# Patient Record
Sex: MALE | Race: WHITE
[De-identification: names, ages, dates, MRNs, and addresses within clinical notes are randomized per-mention and may not be internally consistent; named-entity substitution may affect disease eponyms.]

---

## 2019-02-15 ENCOUNTER — HOSPITAL ENCOUNTER (INPATIENT)
Dept: HOSPITAL 95 - ER | Age: 84
LOS: 20 days | Discharge: NURSING, REHAB OR LONG-TERM CARE | DRG: 853 | End: 2019-03-07
Attending: HOSPITALIST | Admitting: HOSPITALIST
Payer: MEDICARE

## 2019-02-15 VITALS — BODY MASS INDEX: 29.39 KG/M2 | WEIGHT: 198.42 LBS | HEIGHT: 69.02 IN

## 2019-02-15 DIAGNOSIS — E87.2: ICD-10-CM

## 2019-02-15 DIAGNOSIS — R06.89: ICD-10-CM

## 2019-02-15 DIAGNOSIS — A41.9: Primary | ICD-10-CM

## 2019-02-15 DIAGNOSIS — N20.0: ICD-10-CM

## 2019-02-15 DIAGNOSIS — E78.00: ICD-10-CM

## 2019-02-15 DIAGNOSIS — R06.6: ICD-10-CM

## 2019-02-15 DIAGNOSIS — I48.91: ICD-10-CM

## 2019-02-15 DIAGNOSIS — I16.0: ICD-10-CM

## 2019-02-15 DIAGNOSIS — I12.9: ICD-10-CM

## 2019-02-15 DIAGNOSIS — E87.0: ICD-10-CM

## 2019-02-15 DIAGNOSIS — R65.20: ICD-10-CM

## 2019-02-15 DIAGNOSIS — I48.2: ICD-10-CM

## 2019-02-15 DIAGNOSIS — J95.89: ICD-10-CM

## 2019-02-15 DIAGNOSIS — K57.30: ICD-10-CM

## 2019-02-15 DIAGNOSIS — K80.20: ICD-10-CM

## 2019-02-15 DIAGNOSIS — K52.9: ICD-10-CM

## 2019-02-15 DIAGNOSIS — N18.3: ICD-10-CM

## 2019-02-15 DIAGNOSIS — R33.9: ICD-10-CM

## 2019-02-15 DIAGNOSIS — N17.9: ICD-10-CM

## 2019-02-15 DIAGNOSIS — K55.059: ICD-10-CM

## 2019-02-15 DIAGNOSIS — Z79.82: ICD-10-CM

## 2019-02-15 DIAGNOSIS — K65.9: ICD-10-CM

## 2019-02-15 DIAGNOSIS — E86.0: ICD-10-CM

## 2019-02-15 DIAGNOSIS — E87.1: ICD-10-CM

## 2019-02-15 LAB
ALBUMIN SERPL BCP-MCNC: 3.9 G/DL (ref 3.4–5)
ALBUMIN/GLOB SERPL: 1.1 {RATIO} (ref 0.8–1.8)
ALT SERPL W P-5'-P-CCNC: 21 U/L (ref 12–78)
ANION GAP SERPL CALCULATED.4IONS-SCNC: 12 MMOL/L (ref 6–16)
AST SERPL W P-5'-P-CCNC: 53 U/L (ref 12–37)
BASOPHILS # BLD AUTO: 0.03 K/MM3 (ref 0–0.23)
BASOPHILS NFR BLD AUTO: 0 % (ref 0–2)
BILIRUB SERPL-MCNC: 0.9 MG/DL (ref 0.1–1)
BUN SERPL-MCNC: 28 MG/DL (ref 8–24)
CALCIUM SERPL-MCNC: 8.7 MG/DL (ref 8.5–10.1)
CHLORIDE SERPL-SCNC: 110 MMOL/L (ref 98–108)
CO2 SERPL-SCNC: 23 MMOL/L (ref 21–32)
CREAT SERPL-MCNC: 1.27 MG/DL (ref 0.6–1.2)
DEPRECATED RDW RBC AUTO: 47.8 FL (ref 35.1–46.3)
EOSINOPHIL # BLD AUTO: 0 K/MM3 (ref 0–0.68)
EOSINOPHIL NFR BLD AUTO: 0 % (ref 0–6)
ERYTHROCYTE [DISTWIDTH] IN BLOOD BY AUTOMATED COUNT: 13.2 % (ref 11.7–14.2)
GLOBULIN SER CALC-MCNC: 3.7 G/DL (ref 2.2–4)
GLUCOSE SERPL-MCNC: 171 MG/DL (ref 70–99)
HCT VFR BLD AUTO: 46.3 % (ref 37–53)
HGB BLD-MCNC: 15.6 G/DL (ref 13.5–17.5)
IMM GRANULOCYTES # BLD AUTO: 0.08 K/MM3 (ref 0–0.1)
IMM GRANULOCYTES NFR BLD AUTO: 1 % (ref 0–1)
LYMPHOCYTES # BLD AUTO: 0.7 K/MM3 (ref 0.84–5.2)
LYMPHOCYTES NFR BLD AUTO: 4 % (ref 21–46)
MCHC RBC AUTO-ENTMCNC: 33.7 G/DL (ref 31.5–36.5)
MCV RBC AUTO: 98 FL (ref 80–100)
MONOCYTES # BLD AUTO: 1.07 K/MM3 (ref 0.16–1.47)
MONOCYTES NFR BLD AUTO: 6 % (ref 4–13)
NEUTROPHILS # BLD AUTO: 15.18 K/MM3 (ref 1.96–9.15)
NEUTROPHILS NFR BLD AUTO: 89 % (ref 41–73)
NRBC # BLD AUTO: 0 K/MM3 (ref 0–0.02)
NRBC BLD AUTO-RTO: 0 /100 WBC (ref 0–0.2)
PLATELET # BLD AUTO: 124 K/MM3 (ref 150–400)
POTASSIUM SERPL-SCNC: 4.3 MMOL/L (ref 3.5–5.5)
PROT SERPL-MCNC: 7.6 G/DL (ref 6.4–8.2)
SODIUM SERPL-SCNC: 145 MMOL/L (ref 136–145)

## 2019-02-15 PROCEDURE — C9113 INJ PANTOPRAZOLE SODIUM, VIA: HCPCS

## 2019-02-15 PROCEDURE — C1769 GUIDE WIRE: HCPCS

## 2019-02-15 PROCEDURE — C1887 CATHETER, GUIDING: HCPCS

## 2019-02-15 PROCEDURE — C1751 CATH, INF, PER/CENT/MIDLINE: HCPCS

## 2019-02-15 PROCEDURE — C1757 CATH, THROMBECTOMY/EMBOLECT: HCPCS

## 2019-02-15 PROCEDURE — C1760 CLOSURE DEV, VASC: HCPCS

## 2019-02-15 PROCEDURE — P9016 RBC LEUKOCYTES REDUCED: HCPCS

## 2019-02-15 PROCEDURE — G0378 HOSPITAL OBSERVATION PER HR: HCPCS

## 2019-02-15 PROCEDURE — C1894 INTRO/SHEATH, NON-LASER: HCPCS

## 2019-02-15 NOTE — NUR
PATIENT ADMITTED THIS AFTERNOON . HE IS ALERT AND ORIENTED, INDEPENDANT IN THE
ROOM. HE IS IN SEVERE PAIN AND REQUIRES PAIN MEDS Q2 HOURS. FAMILY AT BEDSIDE.

## 2019-02-16 LAB
ANION GAP SERPL CALCULATED.4IONS-SCNC: 12 MMOL/L (ref 6–16)
BUN SERPL-MCNC: 26 MG/DL (ref 8–24)
CALCIUM SERPL-MCNC: 8.8 MG/DL (ref 8.5–10.1)
CHLORIDE SERPL-SCNC: 109 MMOL/L (ref 98–108)
CO2 SERPL-SCNC: 23 MMOL/L (ref 21–32)
CREAT SERPL-MCNC: 1.23 MG/DL (ref 0.6–1.2)
DEPRECATED RDW RBC AUTO: 49.1 FL (ref 35.1–46.3)
ERYTHROCYTE [DISTWIDTH] IN BLOOD BY AUTOMATED COUNT: 13.6 % (ref 11.7–14.2)
GLUCOSE SERPL-MCNC: 164 MG/DL (ref 70–99)
GLUCOSE UR-MCNC: (no result) MG/DL
HCT VFR BLD AUTO: 49.4 % (ref 37–53)
HGB BLD-MCNC: 16.4 G/DL (ref 13.5–17.5)
KETONES UR STRIP-MCNC: (no result) MG/DL
MCHC RBC AUTO-ENTMCNC: 33.2 G/DL (ref 31.5–36.5)
MCV RBC AUTO: 98 FL (ref 80–100)
NRBC # BLD AUTO: 0 K/MM3 (ref 0–0.02)
NRBC BLD AUTO-RTO: 0 /100 WBC (ref 0–0.2)
PLATELET # BLD AUTO: 114 K/MM3 (ref 150–400)
POTASSIUM SERPL-SCNC: 4.2 MMOL/L (ref 3.5–5.5)
PROT UR STRIP-MCNC: (no result) MG/DL
PROTHROMBIN TIME: 11.6 SEC (ref 9.7–11.5)
RBC #/AREA URNS HPF: (no result) /HPF (ref 0–2)
SODIUM SERPL-SCNC: 144 MMOL/L (ref 136–145)
SP GR SPEC: 1.01 (ref 1–1.02)
UROBILINOGEN UR STRIP-MCNC: (no result) MG/DL
WBC #/AREA URNS HPF: (no result) /HPF (ref 0–5)

## 2019-02-16 NOTE — NUR
SHIFT SUMMARY
PT AXO, PLEASANT AND COOPERATIVE WITH CARE THOUGH PAINFUL 7-10/10. PT
MEDICATED PER EMAR. BLADDER SCAN Q6, STRAIGHT CATH OVER 350 ML, STRAIGHT
CATHED ONCE THIS SHIFT, US SENT. PT HAD 11 BEAT RUN OF VTACH, SEE PRIOR NOTE.
GI CONSULT AND SURGICAL CONSULT CALLED. PT UP WITH SBA. BED IN LOW POSITION,
CALL LIGHT WITHIN REACH.

## 2019-02-16 NOTE — NUR
AT 0810 NURSE RECIEVED A CALL FROM [employer] THAT PT HAD 11 BEAT RUN OF
V-TACH. PT AT [phone] /83. /91 AT 0727. DR FOSTER CALLED AT 0814. NEW
ORDERS INITIATED.

## 2019-02-16 NOTE — NUR
PT COMPLAINS OF PAIN 20/10, WRITHING AROUND IN BED, STATES THAT PAIN IS DULL
BUT INTENSE DESPITE PAIN CONTROL PER EMAR. DR FOSTER CALLED AT 0730 PER PT REQUEST AND NURSING JUDGEMENT.
VS; 97.6, 79, 20, 190/91, 95% ON RA. NEW ORDERS INITIATED.

## 2019-02-17 LAB
ANION GAP SERPL CALCULATED.4IONS-SCNC: 13 MMOL/L (ref 6–16)
BASOPHILS # BLD: 0 K/MM3 (ref 0–0.23)
BASOPHILS NFR BLD: 0 % (ref 0–2)
BUN SERPL-MCNC: 28 MG/DL (ref 8–24)
CALCIUM SERPL-MCNC: 8.8 MG/DL (ref 8.5–10.1)
CHLORIDE SERPL-SCNC: 113 MMOL/L (ref 98–108)
CO2 SERPL-SCNC: 19 MMOL/L (ref 21–32)
CREAT SERPL-MCNC: 1.26 MG/DL (ref 0.6–1.2)
DEPRECATED RDW RBC AUTO: 49.7 FL (ref 35.1–46.3)
DEPRECATED RDW RBC AUTO: 50.4 FL (ref 35.1–46.3)
EOSINOPHIL # BLD: 0 K/MM3 (ref 0–0.68)
EOSINOPHIL NFR BLD: 0 % (ref 0–6)
ERYTHROCYTE [DISTWIDTH] IN BLOOD BY AUTOMATED COUNT: 13.8 % (ref 11.7–14.2)
ERYTHROCYTE [DISTWIDTH] IN BLOOD BY AUTOMATED COUNT: 14 % (ref 11.7–14.2)
GLUCOSE SERPL-MCNC: 149 MG/DL (ref 70–99)
HCT VFR BLD AUTO: 48.2 % (ref 37–53)
HCT VFR BLD AUTO: 48.3 % (ref 37–53)
HGB BLD-MCNC: 16 G/DL (ref 13.5–17.5)
HGB BLD-MCNC: 16 G/DL (ref 13.5–17.5)
LYMPHOCYTES # BLD: 0.61 K/MM3 (ref 0.84–5.2)
LYMPHOCYTES NFR BLD: 3 % (ref 21–46)
MCHC RBC AUTO-ENTMCNC: 33.1 G/DL (ref 31.5–36.5)
MCHC RBC AUTO-ENTMCNC: 33.2 G/DL (ref 31.5–36.5)
MCV RBC AUTO: 97 FL (ref 80–100)
MCV RBC AUTO: 98 FL (ref 80–100)
METAMYELOCYTES # BLD MANUAL: 0.2 K/MM3 (ref 0–0)
METAMYELOCYTES NFR BLD MANUAL: 1 % (ref 0–0)
MONOCYTES # BLD: 2.86 K/MM3 (ref 0.16–1.47)
MONOCYTES NFR BLD: 14 % (ref 4–13)
NEUTS BAND NFR BLD MANUAL: 23 % (ref 0–8)
NEUTS SEG # BLD MANUAL: 16.78 K/MM3 (ref 1.96–9.15)
NEUTS SEG NFR BLD MANUAL: 59 % (ref 41–73)
NRBC # BLD AUTO: 0 K/MM3 (ref 0–0.02)
NRBC # BLD AUTO: 0 K/MM3 (ref 0–0.02)
NRBC BLD AUTO-RTO: 0 /100 WBC (ref 0–0.2)
NRBC BLD AUTO-RTO: 0 /100 WBC (ref 0–0.2)
PLATELET # BLD AUTO: 100 K/MM3 (ref 150–400)
PLATELET # BLD AUTO: 103 K/MM3 (ref 150–400)
POTASSIUM SERPL-SCNC: 4.1 MMOL/L (ref 3.5–5.5)
SODIUM SERPL-SCNC: 145 MMOL/L (ref 136–145)
TOTAL CELLS COUNTED BLD: 100
TROPONIN I SERPL-MCNC: 0.38 NG/ML (ref 0–0.04)

## 2019-02-17 PROCEDURE — 0DBA0ZZ EXCISION OF JEJUNUM, OPEN APPROACH: ICD-10-PCS | Performed by: SURGERY

## 2019-02-17 NOTE — NUR
SHIFT SUMMARY:
PT'S PAIN HAS BEEN BETTER CONTROLLED WITH THE PCA. HE STILL HAS PAIN, BUT HE
HAS BEEN ABLE TO REST AND ISN'T GRIMACING FREQUENTLY. HIS LUNGS ARE CLEAR AND
HE IS ON 2L/NC, BUT IN HIS MOUTH BECAUSE IS HAS BEEN MOUTH BREATHING WHILE
SLEEPING. HE HAS BEEN WAKING UP AND USING THE PCA MOSTLY APPROPRIATELY.
OCCASIONALLY REQUIRES REMINDERS ABOUT HOW IT WORKS. BP WAS HYPERTENSIVE WHEN
HE FIRST GOT BACK FROM OR, BUT BP HAS IMPROVED THROUGHOUT THE SHIFT. ABDOMEN
REMAINS VERY TENDER. BOWEL TONES ARE TINKLING. WOUND VAC IN PLACE AND EDGES OF
WOUND ARE C/D/I. SS DRAINAGE FROM WOUND VAC. NG TO LIS. FAMILY HAS BEEN AT THE
BEDSIDE MOST OF THE SHIFT, BUT APPROPRIATELY ALLOWING PT TO REST.

## 2019-02-17 NOTE — NUR
PT ARRIVED TO ICU 10 AT 1110 POST EX LAP. PT HAS AN OPEN ABDOMEN WITH WOUND
VAC OVER IT. NG TUBE IN AS WELL. PT STATES HE IS HAVING A LOT OF PAIN.
DILAUDID GIVEN PER ORDERS. IV FLUIDS STARTED. BP ELEVATED, HYDRALAZINE GIVEN.
PT IS IN AFIB, RATE CONTROLLED. LUNGS CLEAR, ON 2L/NC. BOWEL TONES ARE
PRESENT. WOUND VAC HAS SS DRAINAGE IN IT. HAGER DRAINING DARK YELLOW URINE.
PT'S SON AT THE BEDSIDE AND WAS UPDATED BY DR. JAVED. PLAN OF CARE DISCUSSED
WITH HIM. DR. JAVED GAVE ORDERS FOR PT TO BE ICU STATUS AND TO START THE
HEPARIN DRIP AT 1400.

## 2019-02-17 NOTE — NUR
PT'S HEPARIN GTT STARTED. DR. JAVED GAVE VO TO NOT GIVE HEPARIN BOLUS, SO
BOLUS HELD. LACTIC ACID FOR THE AM PLACED PER DR. JAVED, AND ORDERS FOR LR
BOLUS AND ICNREASE IN CONTINUOUS RATE PER DR. FOSTER AFTER LAST LACTIC ACID
VALUE. MORPHINE PCA STARTED. WHEN PT IS ASLEEP HE APPEARS COMFORTABLE, BUT
WHEN HE WAKES UP HE HAS HICCUPS THAT ARE CAUSING HIM A LOT OF PAIN. THIS HAS
ALL BEEN DISCUSSED WITH DR. JAVED, WHICH IS WHY PAIN MANAGEMENT WAS SWITCHED
TO PCA. DISCUSSED ALL THIS WITH FMILY AND PT AS WELL AS REALISTIC PAIN GOAL
FOR THE PT. ALL HAVE VERBALIZED UNDERSTANDING.

## 2019-02-17 NOTE — NUR
[place] of Care:
 
Patient sleeping, easily roused via verbal stimuli, oriented x4. C/o
acceptable level of pain to ABD at rest 1-2/10, increased to 9/10 with
coughing or repositioning. Patient states morphine PCA effectively managing
pain.  Morphine PCA dosed at 1mg/hr, PCA of 1mg q10 min with 24mg 4hr lockout,
dosing confirmed with day shift RN. Heparin gtt at 15u/kg/hr, doses at 77kg,
confirmed with EMAR and day shift RN, next PTT at 2000hr. LR infusing at
150ml/hr. Power-glide to rt upper arm patent and intact, peripheral IV to lt
AC patent and intact.  Wound-Vac to open mid/lower ABD, dressing compressed
and intact, suctioning small amounts of sanguinous fluid. Ndiaye cath patent
and intact, draining light yellow clear urine. [place] rhythm shows A-fibb,
rate- 70's-80's, BP stable. Call light in reach, makes needs known. Will
continue to monitor for pain, comfort, safety.

## 2019-02-17 NOTE — NUR
SUMMARY: A/O, PLEASANT/COOPERATIVE AND SPECIFIES NEEDS. HE'S A SBA OOB AND
USED URINAL W/O DIFFICULTY. Q6H BLADDER SCANS COMPLETED AND PT NOTED TO RETAIN
BUT PVR WAS <350 MLS BOTH TIMES. ABDO CONT'S INTERMITTENTLY PAINFUL W/DILAUDID
PRN RECIEVED FOR TOLERABLE CONTROL X2 DOSES. HIS SBP WAS ALSO ELEVATED >160
W/PRN HYDRALAZINE RECIEVED X1 DOSE. HICCUPS PERSIST AT TIMES. PT REMAINS IN
A.FIB W/PVC'S AT 70'S BPM. PT DIDN'T HAVE BM THIS SHIFT SO STAFF UNABLE TO
OBTAIN STOOL SPECIMEN. SX CONSULT PENDING. WBC'S TRENDED SLIGHTLY UPWARD
DESPITE RECIEVING IV ZOSYN Q6H. NO ACUTE CHANGES, VSS/AFREBRILE. WILL MONITOR
AND REPORT TO DAY RN.

## 2019-02-17 NOTE — NUR
PT LEFT ROOM VIA DARBARBARA TO OPERATING ROOM VIA RN AND ANAESTHESIOLOGIST, DR VALADEZ AT 0950. PT'S SON PRESENT. PT MEDICATED FOR PAIN AT 0744 PER MAR. IV
PATENT AND SALINE LOCKED. PT BELONGINGS WILL BE MOVED TO PT'S NEW ROOM WHEN
ASSIGNMENT IS COMPLETE, THEN NURSE WILL CALL RECEIVING NURSE TO GIVE REPORT.

## 2019-02-18 LAB
ANION GAP SERPL CALCULATED.4IONS-SCNC: 6 MMOL/L (ref 6–16)
BASOPHILS # BLD: 0 K/MM3 (ref 0–0.23)
BASOPHILS NFR BLD: 0 % (ref 0–2)
BUN SERPL-MCNC: 36 MG/DL (ref 8–24)
CALCIUM SERPL-MCNC: 8.2 MG/DL (ref 8.5–10.1)
CHLORIDE SERPL-SCNC: 114 MMOL/L (ref 98–108)
CO2 SERPL-SCNC: 27 MMOL/L (ref 21–32)
CREAT SERPL-MCNC: 1.69 MG/DL (ref 0.6–1.2)
DEPRECATED RDW RBC AUTO: 51.7 FL (ref 35.1–46.3)
EOSINOPHIL # BLD: 0 K/MM3 (ref 0–0.68)
EOSINOPHIL NFR BLD: 0 % (ref 0–6)
ERYTHROCYTE [DISTWIDTH] IN BLOOD BY AUTOMATED COUNT: 14.1 % (ref 11.7–14.2)
GLUCOSE SERPL-MCNC: 128 MG/DL (ref 70–99)
HCT VFR BLD AUTO: 39 % (ref 37–53)
HGB BLD-MCNC: 12.7 G/DL (ref 13.5–17.5)
LYMPHOCYTES # BLD: 0.33 K/MM3 (ref 0.84–5.2)
LYMPHOCYTES NFR BLD: 2 % (ref 21–46)
MCHC RBC AUTO-ENTMCNC: 32.6 G/DL (ref 31.5–36.5)
MCV RBC AUTO: 100 FL (ref 80–100)
METAMYELOCYTES # BLD MANUAL: 0.16 K/MM3 (ref 0–0)
METAMYELOCYTES NFR BLD MANUAL: 1 % (ref 0–0)
MONOCYTES # BLD: 0.84 K/MM3 (ref 0.16–1.47)
MONOCYTES NFR BLD: 5 % (ref 4–13)
NEUTS BAND NFR BLD MANUAL: 23 % (ref 0–8)
NEUTS SEG # BLD MANUAL: 15.5 K/MM3 (ref 1.96–9.15)
NEUTS SEG NFR BLD MANUAL: 69 % (ref 41–73)
NRBC # BLD AUTO: 0.03 K/MM3 (ref 0–0.02)
NRBC BLD AUTO-RTO: 0.2 /100 WBC (ref 0–0.2)
PH BLDA: 7.41 [PH] (ref 7.35–7.45)
PLATELET # BLD AUTO: 93 K/MM3 (ref 150–400)
POTASSIUM SERPL-SCNC: 4.3 MMOL/L (ref 3.5–5.5)
SODIUM SERPL-SCNC: 147 MMOL/L (ref 136–145)
TOTAL CELLS COUNTED BLD: 100

## 2019-02-18 PROCEDURE — 04953ZZ DRAINAGE OF SUPERIOR MESENTERIC ARTERY, PERCUTANEOUS APPROACH: ICD-10-PCS | Performed by: RADIOLOGY

## 2019-02-18 PROCEDURE — B4141ZZ FLUOROSCOPY OF SUPERIOR MESENTERIC ARTERY USING LOW OSMOLAR CONTRAST: ICD-10-PCS | Performed by: SURGERY

## 2019-02-18 PROCEDURE — 5A1935Z RESPIRATORY VENTILATION, LESS THAN 24 CONSECUTIVE HOURS: ICD-10-PCS | Performed by: SURGERY

## 2019-02-18 PROCEDURE — 0DB80ZZ EXCISION OF SMALL INTESTINE, OPEN APPROACH: ICD-10-PCS | Performed by: SURGERY

## 2019-02-18 PROCEDURE — 0BH17EZ INSERTION OF ENDOTRACHEAL AIRWAY INTO TRACHEA, VIA NATURAL OR ARTIFICIAL OPENING: ICD-10-PCS | Performed by: SURGERY

## 2019-02-18 NOTE — NUR
HEART CENTER:
HC STAFF IN ROOM TO PREP PT & TAKE HIM TO CATH LAB. CONSENT IS VERIFIED, PT
AWAKE & UNDERSTANDING OF PROCEDURE. HE HAS BEEN TAKEN TO HC.
WILL AWAIT PT's ARRIVAL BACK TO UNIT & UPDATE AS NEEDED.

## 2019-02-18 NOTE — NUR
02/18/19 1521 Zahida Morrow
PATIENT ARRIVED FROM ICU WITH WOUND VAC IN PLACE. DR JAVED REMOVED
WOUND VAC AND REQUESTED INTACT SKIN BE PREPPED WITH CHLOROPREP. NO
PREP WAS DONE TO OPEN ABDOMEN. PATIENT ON SCHEDULED ANTIBIOTICS.

## 2019-02-18 NOTE — NUR
PT UPDATE:
 
DR JAVED CALLED TO REPORT A SURGICAL TIME OF 1500. ORDERED TO HAVE HEPARIN
GTT STOPPED AT 1300, 2 HRS PRIOR TO SURGICAL TIME.
 
CALLED [facility], AND PT HAS PLANNED PROCEDURE  TIME OF 1130.

## 2019-02-18 NOTE — NUR
Shift Summary:
 
Patient slept well throughout shift. Pain effectively managed with morphine
PCA. Denies dyspnea/SOB, O2-92-94% on 2L/NC. At approx 0300hr, patient's ABD
wound-vac found off due to low battery, power-cord not found in room. New
power cord obtained, and pump turned back on. Pump then alarmed clogged as
small amount of blood had pooled under clear/occlusive dressing, although
dressing remains intact. Contacted Dr. Miller this morning r/t wound vac and
urine output (325ml). No new orders received r/t wound vac, informed patient
will likely go back to OR today. Received order for x1L bolus of LR. Informed
pharmacy (per Dr. Miller) to keep target rang PTT at 80 or less. Last PTT-93.2,
dose decreased from 15u to 14u/kg/hr, next PTT at 1200. Power-glide remains
patent and intact. Ndiaye cath patent and intact, draining clear urine with
sediment. Will continue to monitor until report to day shift RN.

## 2019-02-18 NOTE — NUR
ASSUMED CARE:
REPORT RECEIVED FROM ALEXANDRU CHEN RN. ASSUMED CARE OF THIS PT AT APPROX 0700.
ON ASSESSMENT, THE PT IS RESTING QUIETLY. HE AWAKENS EASILY TO VERBAL STIMULUS
& STS PAIN IS "OKAY." WHEN ASKED HOW HE WOULD NUMERICALLY RATE PAIN, HE STS
10/10, BUT ALSO THAT THE PAIN IS "IMPROVED." USE OF PCA DISCUSSED & PT
VERBALIZES UNDERSTANDING. THE WOUND VAC AT BEDSIDE IS ALARMING R/T BEING
CLOTTED OFF. WOUND VAC DRESSING TO MIDLINE ABD IS INTACT, ALTHOUGH IT IS
BULGING SLIGHTLY FROM SANGUINOUS DRAINAGE BENEATH THE DRESSING. THE SURGEON IS
AWARE OF THESE THINGS & HAS BEEN IN THE ROOM TO SEE THE PT THIS AM PRIOR TO
THIS RN ASSUMING CARE. PLAN IS FOR PT TO RETURN TO OR SOMETIME THIS AFTERNOON.
WILL CONTINUE TO MONITOR & UPDATE AS NEEDED.

## 2019-02-18 NOTE — NUR
SHIFT SUMMARY:
NO ACUTE CHANGES SINCE PRIOR UPDATE. PT REMAINS INTUBATED/SEDATED. HE IS
RESTING QUIETLY W/ NO S/SX PAIN OR DISCOMFORT NOTED AT THIS TIME. BILAT SOFT
WRIST RESTRAINTS IN PLACE. LS REMAIN DIM IN BASES, VENT SETTINGS: AC 14, TV
450, PEEP 5 & FiO2 35%. AFIB W/ HR 70-80s NOTED ON MONITOR. NGT HAS BEEN
REPLACED W/ OGT PER DR. ALMODOVAR R/T SINUSITIS. HAGER PATENT/DRAINING DARK
YELLOW URINE, MINIMAL OUTPUT THIS SHIFT. WOUND VAC REMAINS CDI, NO OUTPUT
NOTED IN CANISTER. R FEMORAL SITE REMAINS FREE OF BLEEDING, BRUISING OR
HEMATOMA FORMATION & IS SOFT TO PALPATION.
WILL CONTINUE TO MONITOR & REPORT OFF TO ONCOMING RN.

## 2019-02-18 NOTE — NUR
RETURN FROM DAY SURGERY / UPDATE:
PT RETURNED TO  FROM DAY SURG AT APPROX 1640. HE IS DROWSY ON ARRIVAL, ETT
IN PLACE & BEING VENTILATED VIA DAY SURG MARIELOS SILVA. ETT ATTACHED TO VENT BY
RT CHE. VENT SETTINGS: AC 14, , PEEP 5 & FIO2 35%. PROPOFOL
INITIATED AS ORDERED FOR SEDATION. WOUND VAC IN PLACE TO MIDLINE ABD. R
FEMORAL PUNCTURE SITE FROM CATH LAB IS FREE OF BLEEDING, BRUISING & HEMATOMA
FORMATION, SOFT TO PALPATION.
 
DR. ALMODOVAR HAS BEEN CONSULTED & IS BEDSIDE SHORTLY AFTER PT's ARRIVAL TO UNIT.
ORDERS HAVE BEEN PLACED FOR BILAT SOFT WRIST RESTRAINTS. PT JEFFERY WELL.
WILL CONTINUE TO MONITOR & UPDATE AS NEEDED.

## 2019-02-18 NOTE — NUR
RETURN FROM HEART CENTER / UPDATE:
PT BACK TO  AT 1335 FOLLOWING PROCEDURE IN HEART CENTER. HE IS RESTING
QUIETLY AT THIS TIME, VSS. PER REPORT, HE RECEIVED 1MG VERSED & 25 MCG
FENTANYL FOR PROCEDURE. ANGIOSEAL TO R FEMORAL ARTERY IS CDI. AREA IS SOFT TO
PALPATION W/ NO BLEEDING, BRUISING OR HEMATOMA FORMATION NOTED AT THIS TIME.
2L NC ON AT THIS TIME, O2 SATS > 90%.
 
THIS RN HAS NOTIFIED DAY SURGERY OF PT's RETURN TO , PLAN IS TO TAKE HIM TO
OR AT APPROX 1430.
WILL CONTINUE TO MONITOR & UPDATE AS NEEDED.

## 2019-02-18 NOTE — NUR
CARE ASSUMED
 
REPORT RECEIVED, CARE ASSUMED AT 1900 FROM MAKENZIE, RN AND MARIELOS ANDREW. PT
INITIALLY SEDATED WITH 10 MCG/KG/MIN OF PROPOFOL, AND VERY DIFFICULT TO
AROUSE. MINIMAL RESPONSE WITH STERNAL RUB. SEDATION DECREASED TO 5 MCG/KG/MIN.
DURING ORAL CARE, PT BECAME AROUSED, SHAKING HEAD FROM SIDE TO SIDE, PULLING
AT RESTRAINTS AND REACHING FOR ETT. PT REDIRECTABLE WITH EDUCATION. PT
FORGETFUL, REQUIRING FREQUENT RE-EDUCATION. PT RE-SEDATED. WOUND VAC IN
PLACE TO ABDOMEN, TURNED ON AND RUNNING, NO OUTPUT NOTED. OG IN PLACE WITH
MODERATE GREEN OUTPUT. HAGER IN PLACE WITH MINIMAL OUTPUT. VITALS STABLE. SEE
FLOWSHEETS/ASSESSMENTS.

## 2019-02-18 NOTE — NUR
DAY SURGERY:
RNs FROM DAY SURGERY IN ROOM TO PREP PT. ANESTHESIOLOGIST IN ROOM TO CONSENT
PT. HE WAS ABLE TO SIGN ONE CONSENT BUT THEN SAID HE WAS "TOO WEAK" TO SIGN
THE SECOND SHEET OF PAPER. THE PT VERBALIZES THAT HE IS "OKAY" W/ PROCEDURE &
2 RNs HAVE SIGNED TO WITNESS THIS. PT OUT OF  FOR DAY SURGERY AT 1430.
WILL AWAIT ARRIVAL BACK TO ROOM & UPDATE AS NEEDED.

## 2019-02-19 LAB
ANION GAP SERPL CALCULATED.4IONS-SCNC: 8 MMOL/L (ref 6–16)
BASOPHILS # BLD AUTO: 0.03 K/MM3 (ref 0–0.23)
BASOPHILS # BLD: 0 K/MM3 (ref 0–0.23)
BASOPHILS NFR BLD AUTO: 0 % (ref 0–2)
BASOPHILS NFR BLD: 0 % (ref 0–2)
BUN SERPL-MCNC: 40 MG/DL (ref 8–24)
CALCIUM SERPL-MCNC: 7.9 MG/DL (ref 8.5–10.1)
CHLORIDE SERPL-SCNC: 114 MMOL/L (ref 98–108)
CO2 SERPL-SCNC: 25 MMOL/L (ref 21–32)
CREAT SERPL-MCNC: 1.68 MG/DL (ref 0.6–1.2)
DEPRECATED RDW RBC AUTO: 50.7 FL (ref 35.1–46.3)
EOSINOPHIL # BLD AUTO: 0 K/MM3 (ref 0–0.68)
EOSINOPHIL # BLD: 0 K/MM3 (ref 0–0.68)
EOSINOPHIL NFR BLD AUTO: 0 % (ref 0–6)
EOSINOPHIL NFR BLD: 0 % (ref 0–6)
ERYTHROCYTE [DISTWIDTH] IN BLOOD BY AUTOMATED COUNT: 13.8 % (ref 11.7–14.2)
GLUCOSE SERPL-MCNC: 146 MG/DL (ref 70–99)
HCT VFR BLD AUTO: 33.5 % (ref 37–53)
HGB BLD-MCNC: 10.9 G/DL (ref 13.5–17.5)
IMM GRANULOCYTES # BLD AUTO: 0.16 K/MM3 (ref 0–0.1)
IMM GRANULOCYTES NFR BLD AUTO: 1 % (ref 0–1)
LYMPHOCYTES # BLD AUTO: 0.38 K/MM3 (ref 0.84–5.2)
LYMPHOCYTES # BLD: 0.25 K/MM3 (ref 0.84–5.2)
LYMPHOCYTES NFR BLD AUTO: 3 % (ref 21–46)
LYMPHOCYTES NFR BLD: 2 % (ref 21–46)
MCHC RBC AUTO-ENTMCNC: 32.5 G/DL (ref 31.5–36.5)
MCV RBC AUTO: 100 FL (ref 80–100)
METAMYELOCYTES # BLD MANUAL: 0.12 K/MM3 (ref 0–0)
METAMYELOCYTES NFR BLD MANUAL: 1 % (ref 0–0)
MONOCYTES # BLD AUTO: 0.66 K/MM3 (ref 0.16–1.47)
MONOCYTES # BLD: 0.51 K/MM3 (ref 0.16–1.47)
MONOCYTES NFR BLD AUTO: 5 % (ref 4–13)
MONOCYTES NFR BLD: 4 % (ref 4–13)
MYELOCYTES # BLD MANUAL: 0.12 K/MM3 (ref 0–0)
MYELOCYTES NFR BLD MANUAL: 1 % (ref 0–0)
NEUTROPHILS # BLD AUTO: 11.55 K/MM3 (ref 1.96–9.15)
NEUTROPHILS NFR BLD AUTO: 90 % (ref 41–73)
NEUTS BAND NFR BLD MANUAL: 10 % (ref 0–8)
NEUTS SEG # BLD MANUAL: 11.75 K/MM3 (ref 1.96–9.15)
NEUTS SEG NFR BLD MANUAL: 82 % (ref 41–73)
NRBC # BLD AUTO: 0.04 K/MM3 (ref 0–0.02)
NRBC BLD AUTO-RTO: 0.3 /100 WBC (ref 0–0.2)
PH BLDA: 7.45 [PH] (ref 7.35–7.45)
PLATELET # BLD AUTO: 91 K/MM3 (ref 150–400)
POTASSIUM SERPL-SCNC: 4.2 MMOL/L (ref 3.5–5.5)
SODIUM SERPL-SCNC: 147 MMOL/L (ref 136–145)
TOTAL CELLS COUNTED BLD: 100

## 2019-02-19 NOTE — NUR
REASSESSMENT
 
SINCE PREVIOUS NOTE, PT HAS RESTED QUITELY, PROPOFOL TITRATED BETWEEN 5-10 FOR
SEDATION. PT EXPRESSES NEEDS BY ANSWERING YES/NO QUESTIONS AND FOLLOWS
COMMANDS. PT HAS CONSISTENTLY DENIED PAIN. PT HAS TOLERATED TURNS.
VENT SETTINGS CONSISTENT, O2 SATS IN 90'S, RR 12-20'S. VITALS STABLE. WOUND
VAC CONTINUES TO BE CLEAN/DRY/INTACT WITH NO OUTPUT. RIGHT GROIN SITE
UNCHANGED. HAGER CONTINUES TO SLOWLY DRAIN YELLOW URINE.

## 2019-02-19 NOTE — NUR
Patient continues to rest while on 20mcg/kg/hr Propofol, no changes in Heparin
gtt or fluids. No changes in vent setting and sats upper 90%'s. Abdomen c/d/i
and still scant output.

## 2019-02-19 NOTE — NUR
SUMMARY
 
VITALS STABLE, PT CONTINUES TO EXPRESS NEEDS PRN. WOUND VAC AND RIGHT GROIN
SITE UNCHANGED. VENT SETTINGS UNCHANGED.

## 2019-02-19 NOTE — NUR
Recieved report from Emma ARCEO. Patient laying on right side with HOB at 30
degrees. He is intubated and sedated light and opens eyes to verbal stimuli.
He is intubated with 8.0 RT and 25cm at teeth, vent settings AC 14, ,
FiO2 35% and PEEP 5.0 and sats 98% He has 20ga PowerGlide in AIDEN dressing
intact and site WNL's and is infusing Propofol at 10 mcg/kg/hr and LR at
150ml/hr. He also has 20ga IV LFA dressing intact and site WNL's infusing
Heparin gtt at 14 units/kg/hr. He has midline abdominal wound with wound vac
in place with scant output. He has 14Fr. hartley draining to gravity lobito
colored urine. He is wearing bilateral SCD's to LE's. He is in bilateral soft
wrist restraints to protect lines and tubes.

## 2019-02-19 NOTE — NUR
Sputum sample taken and will be placed on Spontaneouis mode per Dr Lincoln whom
wasm by to evaluate. will consider extubation if does well

## 2019-02-19 NOTE — NUR
patient has been resting well and is very cooperative with care. Wound vac
site C/D/I with scant output. He remains on Ra and sats upper 90@. No changes
in Haparin gtt at 14 units/kg/hr. LR contn ues to run at 50ml/hr. Placed PICC
line in CHARMAINE and will start TPN tomorrow. SCD's bilaterally to LE's. Ndiaye
still draining to gravity cloudy lobito urine. NG to LIS with minimal output

## 2019-02-19 NOTE — NUR
Patient is alert and oriented an d is able to communicate his needs. Dr Graham and Dr Miller have been by and patient has been doing well. Family and
wife has been in and is at bedside. Propofol off and LR at 50ml/hr and no
change ion heparin gtt.

## 2019-02-19 NOTE — NUR
Patient was turned down to 5mcg propofol and is starting to wake upm > Dr Lincoln requested sputum sample. He has been placed on spontaneous PS 7/5 and
sats upper 90%'s. He was extubated and is on RA and able to answer question.

## 2019-02-19 NOTE — NUR
SEDATION VACATION / SPONTANEOUS BREATHING TRIAL
 
SEDATION REMOVED FOR SPONTANEOUS BREATHING TRIAL. PT CALM, COOPERATIVE,
FOLLOWING COMMANDS AND EXPRESSING NEEDS. PT REPEATEDLY ASKS FOR ETT TO BE
TAKEN OUT BY POINTING AND NODDING "YES" WHEN PROMPTED. EDUCATED ABOUT PLAN AND
PURPOSE AND PT AGREEABLE, THOUGH FORGETFUL OF EDUCATION. PT ON PRESSURE
SUPPORT 7/5, TRIAL COMPLETED PER REBECCA RT, AND PT REQUESTS TO STAY UNSEDATED
AND TOLERATED FOR APPROX 2 HOURS. DURING NURSE ROUNDS, PT REQUESTING TO BE
RE-SEDATED PER YES/NO QUESTIONS. ZI, RT TO BEDSIDE TO PLACE PATIENT BACK
ON AC 14/450/5/35. PROPOFOL RE-TITRATED TO EFFECT.

## 2019-02-19 NOTE — NUR
Pulled OG and replaced with NG as he will probably be extubated today and Dr Miller wants tube in to decompress stomach. Increased Propofol to 20 during
insrtion and he tolerated well. He was hitting bedrailm and when asked he
motioned he wanted tube out. Increased Propofol back to 20 to let him rest. He
is LIS to NG.

## 2019-02-20 LAB
ALBUMIN SERPL BCP-MCNC: 1.6 G/DL (ref 3.4–5)
ALBUMIN/GLOB SERPL: 0.5 {RATIO} (ref 0.8–1.8)
ALT SERPL W P-5'-P-CCNC: 12 U/L (ref 12–78)
ANION GAP SERPL CALCULATED.4IONS-SCNC: 7 MMOL/L (ref 6–16)
AST SERPL W P-5'-P-CCNC: 18 U/L (ref 12–37)
BASOPHILS # BLD AUTO: 0.04 K/MM3 (ref 0–0.23)
BASOPHILS NFR BLD AUTO: 0 % (ref 0–2)
BILIRUB SERPL-MCNC: 0.4 MG/DL (ref 0.1–1)
BUN SERPL-MCNC: 46 MG/DL (ref 8–24)
CALCIUM SERPL-MCNC: 7.8 MG/DL (ref 8.5–10.1)
CHLORIDE SERPL-SCNC: 115 MMOL/L (ref 98–108)
CO2 SERPL-SCNC: 25 MMOL/L (ref 21–32)
CREAT SERPL-MCNC: 1.62 MG/DL (ref 0.6–1.2)
DEPRECATED RDW RBC AUTO: 50.4 FL (ref 35.1–46.3)
EOSINOPHIL # BLD AUTO: 0 K/MM3 (ref 0–0.68)
EOSINOPHIL NFR BLD AUTO: 0 % (ref 0–6)
ERYTHROCYTE [DISTWIDTH] IN BLOOD BY AUTOMATED COUNT: 13.7 % (ref 11.7–14.2)
GLOBULIN SER CALC-MCNC: 3.2 G/DL (ref 2.2–4)
GLUCOSE SERPL-MCNC: 147 MG/DL (ref 70–99)
HCT VFR BLD AUTO: 29.4 % (ref 37–53)
HGB BLD-MCNC: 9.8 G/DL (ref 13.5–17.5)
IMM GRANULOCYTES # BLD AUTO: 0.68 K/MM3 (ref 0–0.1)
IMM GRANULOCYTES NFR BLD AUTO: 5 % (ref 0–1)
LYMPHOCYTES # BLD AUTO: 0.43 K/MM3 (ref 0.84–5.2)
LYMPHOCYTES NFR BLD AUTO: 3 % (ref 21–46)
MAGNESIUM SERPL-MCNC: 2.4 MG/DL (ref 1.6–2.4)
MCHC RBC AUTO-ENTMCNC: 33.3 G/DL (ref 31.5–36.5)
MCV RBC AUTO: 99 FL (ref 80–100)
MONOCYTES # BLD AUTO: 1.46 K/MM3 (ref 0.16–1.47)
MONOCYTES NFR BLD AUTO: 10 % (ref 4–13)
NEUTROPHILS # BLD AUTO: 12.23 K/MM3 (ref 1.96–9.15)
NEUTROPHILS NFR BLD AUTO: 82 % (ref 41–73)
NRBC # BLD AUTO: 0.07 K/MM3 (ref 0–0.02)
NRBC BLD AUTO-RTO: 0.5 /100 WBC (ref 0–0.2)
PLATELET # BLD AUTO: 94 K/MM3 (ref 150–400)
POTASSIUM SERPL-SCNC: 3.9 MMOL/L (ref 3.5–5.5)
PROT SERPL-MCNC: 4.8 G/DL (ref 6.4–8.2)
SODIUM SERPL-SCNC: 147 MMOL/L (ref 136–145)

## 2019-02-20 PROCEDURE — 02HV33Z INSERTION OF INFUSION DEVICE INTO SUPERIOR VENA CAVA, PERCUTANEOUS APPROACH: ICD-10-PCS | Performed by: SURGERY

## 2019-02-20 NOTE — NUR
SHIFT SUMMARY NOTE:  PATIENT IS RESTING COMFORTABLY AND WATCHING TV AT THIS
TIME, ALERT AND ORIENTED, ABLE TO MAKE NEEDS KNOWN, LUNGS CLEAR BUT DIMINISHED
ESPECIALLY IN BASES, PATIENT IS IN A-FIB WITH CONTROLLED RATE BETWEEN 50s AND
70s BUT MAY BE AS LOW AS UPPER 40s WHEN SLEEPING, WOUND VAC IN PLACE AND NO
OUTPUT NOTED, PATIENT HAS PICC IN HCARMAINE AND POWERGLIDE IN AIDEN, BOTH ARE FLUSHING
WELL AND HAVE GOOD BLOOD RETURN, PATIENT HAS HAGER CATHETER IN PLACE WITH GOOD
OUTPUT OF DARK YELLOW URINE, SCD'S IN PLACE, NG TUBE IN PLACE TO LIS, 400 CC
OUTPUT OF BLACKISH COLOR, DR. JAVED AWARE, PATIENT RECEIVED FENTANYL TWICE, 25
MCG THIS AM AND 50 MCG THIS AFTERNOON, USES INCENTIVE SPIROMETER EVERY HOUR,
SELF MOTIVATED, HAS GOOD COUGH AND PRODUCES MODERATE AMOUNTS OF CLEAR WHITE
SPUTUM, PATIENT C/O SHORTNESS OF BREATH BRIEFLY, DR. SHAIKH MADE AWARE, CHEST
XRAY WAS ORDERED, BUT SHOWED NO CHANGES COMPARED TO AM XRAY, PATIENT WAS
PLACED ON 3L NC AND REPORTED NO MORE SHORTNESS OF BREATH, PATIENT WAS UP TO
CHAIR VIA CEILING LIFT, AND TOLERATED 3 HOURS IN CHAIR, THEN RETURNED TO BED,
CLINIMIX WAS CHANGED TO TPN, PATIENT CONTINUES TO BE ON HEPARIN DRIP AT RATE
OF 14 UNITS, ALSO ON ZOSYN AT THIS TIME, FAMILY AND FRIENDS IN TO SEE PATIENT,
CALL LIGHT IN REACH, WILL CONTINUE TO MONITOR AND GIVE REPORT TO ONCOMING
NIGHT SHIFT.

## 2019-02-20 NOTE — NUR
START OF SHIFT NOTE:  PATIENT IS RESTING COMFORTABLY, DENIES PAIN AT THIS
TIME, AFEBRILE, NG IN PLACE, DRAINING BLACKISH/BROWN LIQUID, MIDLINE INCISION,
WOUND VAC IN PLACE, NO OUTPUT NOTED IN COLLECTION CHAMBER, HAGER CATHETER IN
PLACE, DR. JAVED IN TO SEE PATIENT, VSS, PATIENT HAS CLINIMAX AND HEPARIN AT
14 UNITS INFUSING AT THIS POINT, PATIENT IS AWAKE, ALERT AND ORIENTED, C/O
PERIODIC HICCUPS, A-FIB WITH CONTROLLED RATE IN 50s TO 60s, SON AT BEDSIDE,
CALL LIGHT IN REACH, WILL CONTINUE TO MONITOR.

## 2019-02-20 NOTE — NUR
SUMMARY
 
VITALS STABLE THROUGHOUT NIGHT. PT CALLING FOR NEEDS INCLUDING REPOSITIONING
AND PAIN MEDS. PT INCREASINGLY INVOLVED IN REPOSITIONING AND MOVEMENT WITH
EACH TURN. WOUND VAC CONTINUES TO HAVE NO OUTPUT. NG OUTPUT DECREASING, SEE
I/O FLOWSHEET. HEPARIN GTT INFUSING THROUGHOUT NIGHT PER PHARMACY ORDERS.
HAGER CONTINUES TO DRAIN ADEQUATELY. NEURO/RESP ASSESSMENTS UNCHANGED
THROUGHOUT NIGHT. REPORT TO MARIELOS MITCHELL.

## 2019-02-20 NOTE — NUR
DR. OLSON CALLED AND UPDATE PROVIDED ON PATIENT, UP IN CHAIR, 3L NC, DENIES
SHORTNESS OF BREATH AT THIS TIME, USING INCENTIVE SPIROMETER WITH COACHING,
CONTINUES TO BE UP IN CHAIR, RESTING COMFORTABLY AT THIS TIME, CALL LIGHT IN
REACH, WILL CONTINUE TO MONITOR.

## 2019-02-20 NOTE — NUR
PATIENT ON CALL LIGHT, C/O SHORTNESS OF BREATH, DENIES CHEST PAIN/DISCOMFORT,
PLACED ON 3L NC, DR. SHAIKH NOTIFIED, NEW ORDERS RECEIVED.

## 2019-02-20 NOTE — NUR
[place] of Care:
 
Patient alert and oriented x4. C/o pain 3/10 to ABD, but states prn fentanyl
is effectively managing pain, denies needs for pain medications at this time.
Denies dyspnea or SOB, O2-98% on 2L/NC. VSS, heart rhythm shows Afibb, rate
40's-60, patient asymptomatic when HR decreases to 40's. NG to LIS, dark
green bile noted in tube, no increase in amount since day shift. MID/lower ABD
incision has wound-vac in place. Wound-vac dressing compress/intact, no
drainage noted, ABD binder in place. PICC to CHARMAINE patent and intact,
Power-glide to AIDEN patent and intact. CPN infusing at 75ml/hr. Heparin gtt
infusing at 14u/kg/hr, doses at 77kg, confirmed with EMAR, next PTT at 2000hr.
Call light in reach, makes needs known. Will continue to monitor for pain,
comfort, safety.

## 2019-02-20 NOTE — NUR
CHEST XRAY ORDERED, PATIENT STATES "OH, I FEEL ALRIGHT", APPEARS TO BE
BREATHING EASIER WITH SUPPLEMENTAL O2, AWAITING PROTABLE CHEST XRAY, CALL
LIGHT IN REACH, WILL CONTINUE TO MONITOR.

## 2019-02-20 NOTE — NUR
PATIENT C/O 5/10 ABDOMINAL PAIN AT 0920, RECEIVED 25 MCG OF FENTANYL,
RECHECKED AT 1000 AND PATIENT STATED THAT PAIN IS NOW DOWN TO 2/10, FENTANYL
EFFECTIVE, CALL LIGHT IN REACH, WILL CONTINUE TO MONITOR.

## 2019-02-21 LAB
ANION GAP SERPL CALCULATED.4IONS-SCNC: 7 MMOL/L (ref 6–16)
BASOPHILS # BLD: 0 K/MM3 (ref 0–0.23)
BASOPHILS NFR BLD: 0 % (ref 0–2)
BUN SERPL-MCNC: 48 MG/DL (ref 8–24)
CALCIUM SERPL-MCNC: 7.7 MG/DL (ref 8.5–10.1)
CHLORIDE SERPL-SCNC: 112 MMOL/L (ref 98–108)
CO2 SERPL-SCNC: 26 MMOL/L (ref 21–32)
CREAT SERPL-MCNC: 1.27 MG/DL (ref 0.6–1.2)
DEPRECATED RDW RBC AUTO: 49.1 FL (ref 35.1–46.3)
EOSINOPHIL # BLD: 0 K/MM3 (ref 0–0.68)
EOSINOPHIL NFR BLD: 0 % (ref 0–6)
ERYTHROCYTE [DISTWIDTH] IN BLOOD BY AUTOMATED COUNT: 13.7 % (ref 11.7–14.2)
GLUCOSE SERPL-MCNC: 153 MG/DL (ref 70–99)
HCT VFR BLD AUTO: 29.9 % (ref 37–53)
HGB BLD-MCNC: 9.9 G/DL (ref 13.5–17.5)
LYMPHOCYTES # BLD: 0.9 K/MM3 (ref 0.84–5.2)
LYMPHOCYTES NFR BLD: 6 % (ref 21–46)
MAGNESIUM SERPL-MCNC: 2.4 MG/DL (ref 1.6–2.4)
MCHC RBC AUTO-ENTMCNC: 33.1 G/DL (ref 31.5–36.5)
MCV RBC AUTO: 99 FL (ref 80–100)
METAMYELOCYTES # BLD MANUAL: 0.9 K/MM3 (ref 0–0)
METAMYELOCYTES NFR BLD MANUAL: 6 % (ref 0–0)
MONOCYTES # BLD: 0.6 K/MM3 (ref 0.16–1.47)
MONOCYTES NFR BLD: 4 % (ref 4–13)
MYELOCYTES # BLD MANUAL: 1.05 K/MM3 (ref 0–0)
MYELOCYTES NFR BLD MANUAL: 7 % (ref 0–0)
NEUTS BAND NFR BLD MANUAL: 11 % (ref 0–8)
NEUTS SEG # BLD MANUAL: 11.58 K/MM3 (ref 1.96–9.15)
NEUTS SEG NFR BLD MANUAL: 66 % (ref 41–73)
NRBC # BLD AUTO: 0.06 K/MM3 (ref 0–0.02)
NRBC BLD AUTO-RTO: 0.4 /100 WBC (ref 0–0.2)
PHOSPHATE SERPL-MCNC: 3.9 MG/DL (ref 2.5–4.9)
PLATELET # BLD AUTO: 106 K/MM3 (ref 150–400)
POTASSIUM SERPL-SCNC: 3.9 MMOL/L (ref 3.5–5.5)
SODIUM SERPL-SCNC: 145 MMOL/L (ref 136–145)
TOTAL CELLS COUNTED BLD: 100
TRIGL SERPL-MCNC: 202 MG/DL (ref 30–160)

## 2019-02-21 NOTE — NUR
PATIENT RECEIVED 10 MG OF HYDRALAZINE AS ORDERED FOR SBPs GREATER THAN 180 AND
MAP GREATER THAN 100s, DR. SHAIKH IN TO SEE PATIENT, NO NEW ORDERS, DR. JAVED
IN TO SEE PATIENT, ALLOWED PATIENT TO HAVE ICE CHIPS SPARINGLY.

## 2019-02-21 NOTE — NUR
PATIENT IS RESTING COMFORTABLY AT THIS TIME, STATES THAT HIS PAIN HAS
SUBSIDED, PATIENT IS MOTIVATED AND DOING INCENTIVE SPIROMETER EVERY HOUR AS
INSTRUCTED, NG OUTPUT IS LIGHTER IN COLOR, LIGHT BROWN LIQUID INSTEAD OF
BLACKISH DRAINAGE, CALL LIGHT IN REACH, WILL CONTINUE TO MONITOR.

## 2019-02-21 NOTE — NUR
Shift Summary:
 
Patient slept on/off throughout shift. C/o ABD pain effectively managed with
prn fentanyl, x3 doses. Denies dyspnea or SOB throughout shift. VSS, heart
rhythm continued in A-fibb, rate mostly in the 50's, occasional/short decrease
into the 40's. Systolic BP increased to 170's-180's, x1 prn dose of
Hydralazine given with good effect. Wound-vac to mid/lower ABD incision,
dressing remains compressed/intact, no drainage throughout shift, ABD binder
in place. NG to LIS throughout shift, total of 300ml dark green output noted.
Ndiaye cath patent and intact, draining light yellow, clear urine. PICC line
and power-glide remain patent and intact. Patient sleeping at this time, makes
needs known. Will continue to monitor until report to day shift RN.

## 2019-02-21 NOTE — NUR
[place] of Care:
 
Patient alert and oriented. C/o mild pain 1-2/10 to ABD, effectively managed
by prn fentanyl given on day shift, denies needs for pain medications at this
time. Denies dyspnea or SOB, O2- 97% on RA. Heart rhythm shows A-fibb in the
50's, BP stable. Heparin gtt at 14u/kg/hr, dosed at 77kg, confirmed with EMAR,
next PTT with morning labs. TPN at 75ml/hr. PICC line (CHARMAINE), Power-glide
(AIDEN), patent and intact. Ndiaye cath patent and intact, draining light yellow
clear urine. Mid/lower ABD incision, wound-vac dressing in place, dressing
compressed/intact, no output. NG to LIS, dark green/brown drainage noted. Call
light in reach, makes needs known. Will continue to monitor for pain, safety,
comfort.

## 2019-02-21 NOTE — NUR
SHIFT SUMMARY NOTE:  PATIENT CONTINUES TO IMPROVE, AWAKE, ALERT AND ORIENTED,
LS CLEAR AND DIMINISHED IN BASES, A-FIB WITH RATE FROM 40s WHEN SLEEPING TO
60s WITH MINIMAL ACTIVITY, BOWEL TONES PRESENT IN ALL FOUR QUADRANTS,
MIDABDOMINAL INCISION SITE HAS WOUND VAC IN PLACE, SEALED AND COMPRESSED, NO
OUTPUT AT THIS TIME, HAGER CATHETER IN PLACE, DRAINING LARGE AMOUNTS OF YELLOW
URINE, SCDs IN PLACE, PATIENT RECEIVED FENTANYL 50 MCG FOR PAIN FOUR TIMES
TODAY, WAS UP IN CHAIR FOR ENTIRE AFTERNOON , FAMILY AT BEDSIDE, FOR DETAILS
SEE SHIFT ASSESSMENT DOCUMENTATION AND NURSES NOTES, CALL LIGHT IN REACH, WILL
CONTINUE TO MONITOR.

## 2019-02-21 NOTE — NUR
START OF SHIFT NOTE: RECEIVED REPORT FROM MARIELOS HORVATH, ASSUMED CARE, PATIENT IS
AWAKE, ALERT AND ORIENTED, ON CALL LIGHT, STATED "I PUSHED THE WRONG BUTTON",
LS CLEAR BUT DIMINISHED IN BASES, A-FIB WITH CONTROLLED RATE BETWEEN 40s TO
60s, MIDABDOMINAL INCISION C/D/I, WOUND VAC INTACT BUT NO OUTPUT, HAGER
CATHETER IN PLACE, SCDs ON, PATIENT DENIES PAIN OR SHORTNESS OF BREATH AT THIS
TIME, REPORTS NOT CHEST PAIN/DISCOMFORT, HEPARIN DRIP AT 14 UNITS, TPN AT 75
INFUSING THROUGH CHARMAINE PICC, PATIENT ALSO HAS AIDEN POWERGLIDE WHICH FLUSHES WELL,
AND HAS GOOD BLOOD RETURN, CALL LIGHT IN REACH, WILL CONTINUE TO MONITOR.

## 2019-02-21 NOTE — NUR
PATIENT ON CALL LIGHT, C/O PAIN 8/10 AT ABDOMINAL INCISION, RECEIVED 50 MCG OF
FENTANYL IV, FAMILY AT BEDSIDE, CALL LIGHT IN REACH, WILL CONTINUE TO MONITOR.

## 2019-02-22 LAB
ALBUMIN SERPL BCP-MCNC: 1.8 G/DL (ref 3.4–5)
ALBUMIN/GLOB SERPL: 0.6 {RATIO} (ref 0.8–1.8)
ALT SERPL W P-5'-P-CCNC: 17 U/L (ref 12–78)
ANION GAP SERPL CALCULATED.4IONS-SCNC: 6 MMOL/L (ref 6–16)
AST SERPL W P-5'-P-CCNC: 18 U/L (ref 12–37)
BASOPHILS # BLD: 0 K/MM3 (ref 0–0.23)
BASOPHILS NFR BLD: 0 % (ref 0–2)
BILIRUB SERPL-MCNC: 0.5 MG/DL (ref 0.1–1)
BUN SERPL-MCNC: 47 MG/DL (ref 8–24)
CALCIUM SERPL-MCNC: 7.5 MG/DL (ref 8.5–10.1)
CHLORIDE SERPL-SCNC: 112 MMOL/L (ref 98–108)
CO2 SERPL-SCNC: 26 MMOL/L (ref 21–32)
CREAT SERPL-MCNC: 1.27 MG/DL (ref 0.6–1.2)
DEPRECATED RDW RBC AUTO: 50.9 FL (ref 35.1–46.3)
EOSINOPHIL # BLD: 0.31 K/MM3 (ref 0–0.68)
EOSINOPHIL NFR BLD: 2 % (ref 0–6)
ERYTHROCYTE [DISTWIDTH] IN BLOOD BY AUTOMATED COUNT: 13.9 % (ref 11.7–14.2)
GLOBULIN SER CALC-MCNC: 3.1 G/DL (ref 2.2–4)
GLUCOSE SERPL-MCNC: 140 MG/DL (ref 70–99)
HCT VFR BLD AUTO: 27.3 % (ref 37–53)
HGB BLD-MCNC: 8.9 G/DL (ref 13.5–17.5)
LYMPHOCYTES # BLD: 1.4 K/MM3 (ref 0.84–5.2)
LYMPHOCYTES NFR BLD: 9 % (ref 21–46)
MAGNESIUM SERPL-MCNC: 2.3 MG/DL (ref 1.6–2.4)
MCHC RBC AUTO-ENTMCNC: 32.6 G/DL (ref 31.5–36.5)
MCV RBC AUTO: 100 FL (ref 80–100)
METAMYELOCYTES # BLD MANUAL: 0.46 K/MM3 (ref 0–0)
METAMYELOCYTES NFR BLD MANUAL: 3 % (ref 0–0)
MONOCYTES # BLD: 1.72 K/MM3 (ref 0.16–1.47)
MONOCYTES NFR BLD: 11 % (ref 4–13)
MYELOCYTES # BLD MANUAL: 0.62 K/MM3 (ref 0–0)
MYELOCYTES NFR BLD MANUAL: 4 % (ref 0–0)
NEUTS BAND NFR BLD MANUAL: 5 % (ref 0–8)
NEUTS SEG # BLD MANUAL: 11.27 K/MM3 (ref 1.96–9.15)
NEUTS SEG NFR BLD MANUAL: 67 % (ref 41–73)
NRBC # BLD AUTO: 0.14 K/MM3 (ref 0–0.02)
NRBC BLD AUTO-RTO: 0.9 /100 WBC (ref 0–0.2)
PHOSPHATE SERPL-MCNC: 3.9 MG/DL (ref 2.5–4.9)
PLATELET # BLD AUTO: 118 K/MM3 (ref 150–400)
POTASSIUM SERPL-SCNC: 4.2 MMOL/L (ref 3.5–5.5)
PROT SERPL-MCNC: 4.9 G/DL (ref 6.4–8.2)
SODIUM SERPL-SCNC: 144 MMOL/L (ref 136–145)
TOTAL CELLS COUNTED BLD: 150

## 2019-02-22 NOTE — NUR
7681-PAGED DR. JAVED REGARDING PT'S COMPLAINTS OF ABDOMINAL PAIN.
3554-DR. JAVED CALLED BACK. INFORMED HIM PT WAS COMPLAINING OF ABDOMINAL PAIN
AN HOUR AND 15MINS OF RECEIVING 50MCG OF FENTANYL. ORDER FOR FENTANYL WAS
25-50MCG EVERY 2 HRS PRN. THIS NURSE DECIDED TO GIVE ANOTHER 25MCG OF FENTANYL
AT O856. DR. JAVED ORDERED FOR CT SCAN OF ABDOMEN AND PELVIS WITHOUT CONTRAST.

## 2019-02-22 NOTE — NUR
1010-TOOK PT TO RADIOLOGY FOR CT SCAN OF ABDOMEN AND PELVIS.
1048-BACK IN THE ROOM. PT IS SITTING ON THE CHAIR AT THIS TIME.

## 2019-02-22 NOTE — NUR
Shift Summary:
 
Patient slept on/off throughout shift. C/o ABD pain 4-5/10, effectively
managed with prn fentanyl. Denies dyspnea/SOB, O2-97-98% on RA. Wound vac to
mid/lower ABD incision, dressing remains compressed/intact, 0ml output, ABD
binder in place. NG to LIS throughout shift, dark green/brown drainage, 280ml
total output. Heart rhythm continues in Afibb, rate 50's-60's, BP stable. PICC
line (CHARMAINE), Power-glide (AIDEN), remain patent and intact. Ndiaye cath remains
patent and intact, draining light yellow clear urine. Call light in reach,
makes needs known. Will continue to monitor until report to day shift RN.

## 2019-02-22 NOTE — NUR
ASSUMED CARE OF PT. PT IS ALERT AND ORIENTED. COMPLAINTS OF SLIGHT SHORTNESS
OF BREATH. PT ON ROOM AIR WITH 98% O2 SATURATION. MIDLINE ABDOMINAL INCISION
WITH WOUND VAC IN PLACE WITH GOOD SEAL.

## 2019-02-22 NOTE — NUR
PT IS BACK IN BED. PT THOUGHT HE MIGHT HAVE A BM. PT DID HAVE A LITTLE BIT OF
SMEAR. PT HAS STATED HE IS TIRED.

## 2019-02-22 NOTE — NUR
6715-DR. DING CAME BY TO SEE PT. UPDATED HIM OF PT'S STATUS.
0558-DR. JAVED CAME BY TO SEE PATIEN. UPDATED HIM OF PT'S CONDITION. PT'S SON
AT BEDSIDE.

## 2019-02-22 NOTE — NUR
PATIENT HAS USED 11 MG OF HIS MORPHINE (2 LOADING DOSE). HE HAS BEEN BOTHERED
BY HICCUPS AND HAS BEEN TRYING TO SLEEP.

## 2019-02-22 NOTE — NUR
ASSESSMENT
ASSUMED CARE OF PT. PT RESTING QUIETLY, AWAKENS EASILY TO VERBAL STIMULI.
DENIES PAIN PT STATES,"I'M NOT HAVING ANY PAIN NOW THAT I HAVE THIS BUTTON TO
PUSH". PT USING PCA MORPHINE FOR PAIN CONTROL. A&O TO PLACE AND TIME. FAMILY
AT BEDSIDE. LUNGS CLEAR BUT DECREASED IN THE BASES ON ROOMAIR. RESP EVEN AND
NONLABORED. PT USING IS AND DOING C&DB WITH ENCOURAGMENT. HEART RATE
IRREGULAR, AFIB IN THE 50'S CONTROLLED RATE. BP STABLE. BT+ ABD SOFT AND
TENDER WITH WOUND VAC TO MIDLINE ABD. DRSG INTACT WITH SUCTION. NG TO LEFT
NARE ON LIS WITH DARK MARROON SECRECTIONS DRAINING. PT TAKING ICE CHIPS.
DENIES N/V. + FLATUS. HAGER CATH DRAINING YELLOW URINE. PICC LINE TO LEFT
UPPER ARM WITH TPN AT 75 ML/HR AND HEPARIN AT 14 UNITS. SITE CLEAR WITH DRSG
INTACT. POWER GLIDE TO RIGHT UPPER ARM WITH NS AT 10 ML/HR AND MORPHINE PCA.
SITE CLEAR. PT REPOSITINED AND LINEN CHANGED. FRITZ CARE DONE.

## 2019-02-22 NOTE — NUR
ASSUMED CARE
REPORT FROM LADONNA RICHARD RN. FAMILY AT BEDSIDE. ORDER FROM DR. JAVED FOR
MOPRHINE PCA.

## 2019-02-23 LAB
MAGNESIUM SERPL-MCNC: 2.3 MG/DL (ref 1.6–2.4)
PHOSPHATE SERPL-MCNC: 4.1 MG/DL (ref 2.5–4.9)

## 2019-02-23 NOTE — NUR
CALLED DR. SOLER ABOUT BRIGHT RED BLOOD IN STOOL. EXPECTED BECAUSE OF HAVING
REMOVED A POLYP DURING HIS COLONOSCOPY

## 2019-02-23 NOTE — NUR
pt arrived to room 229 from icu pt in recliner chair pt has constant hiccups
pt reports pain to abd 3/10 ngt clampped with dk brown liquid hooked to low
int sx small amt out pt stated that he has no nausea wants to sit up for
awhile taking in some ice chips his son at bedside

## 2019-02-23 NOTE — NUR
SHIFT SUMMARY
PT SLEEPING, AWAKENS EASILY TO VERBAL STIMULI. PT ASSISTING WITH TURNING AND
MOVING IN BED. TURNED Q2HRS. PT USING MORPHINE PCA FOR PAIN CONTROL WITH GOOD
RESULTS. PT STATES,"I'M HAVING NO PAIN AND I'M ABLE TO GET SOME SLEEP". LUNGS
CLEAR BUT DECREASED IN THE BASES ON ROOMAIR. PT ENCOURAGED TO C&DB ALSO TO USE
IS. HEART RATE IRREGULAR- AFIB 50-70'S. BP STABLE. BT+ HYPOACTIVE. NG TO LIS
WITH DARK GREEN SECRECTIONS. MIDLINE ABD INCISION WITH WOUND VAC INTACT WITH
GOOD SUCTION. DENIES N/V. PT TAKING ICE CHIPS. PICC LINE TO LEFT UPPER ARM
WITH TPN AT 75 ML/HR, NS TKO FOR ANTIBIOTIC AND HEPARIN AT 14 UNITS. POWER
GLIDE TO RIGHT UPPER ARM WITH NS TKO AND MORPHINE PCA. HAGER CATH PATENT AND
DRAINING YELLOW URINE. NO ACUTE CHANGE THROUGHOUT THE NIGHT. REPORT TO ON
COMING NURSE.

## 2019-02-23 NOTE — NUR
ASSUMED CARE
REPORT FROM MARIELOS AMIN. PATIENT SLEEPING. HEPARIN CONTINUES AT SAME RATE.
MORPHINE PCA SHOWS 2 USED.

## 2019-02-23 NOTE — NUR
bladder scan 345 pt stated he does not have an urge to void yet will have pt
stand and attempt to void in an hr or two

## 2019-02-23 NOTE — NUR
REASSESSMENT
PT SLEEPING, AWAKENS EASILY TO VERBAL STIMULI. DENIES PAIN STATES,"IT FEELS SO
GOOD TO BE ABLE TO SLEEP. I'M HAVING NO PAIN". LUNGS CLEAR BUT DECREASED IN
THE BASES ON ROOMAIR. PT DOING C&DB AND USING IS WITH ASSIST. REPOSITIONED TO
BACK WITH HOB UP. NG RETAPPED, DRAINAGE GREEN LIQUID IN TUBING. HEART RATE
CONT IRREGULAR. BP STABLE. BT+ HYPOACTIVE. WOUND VAC TO ABD INTACT. BLOOD
GLUCOSE CHECKED AND ANTIBIOTIC STARTED. PT BACK TO SLEEP QUICKLY

## 2019-02-23 NOTE — NUR
PATIENT IN GOOD SPIRITS AFTER SLEEPING LAST NIGHT. HAGER CATHETER REMOVED.
ABDOMINAL BINDER PLACED AND PATIENT ASSISTED TO RECLINER. USING HIS MORPHINE
PCA AS NEEDED. ICE CHIPS. PASSING FLATUS. DARK GREEN LIQUID FROM NGT. VISITS
FROM 2 SONS THIS AM. CALL FROM WIFE. PATIENT IS NOW SURGICAL STATUS WITH NO
TELE.

## 2019-02-23 NOTE — NUR
assisted back into tried to void again while pt was standing up still unable
to go in/out cath 450 ml urine out

## 2019-02-24 LAB
ANION GAP SERPL CALCULATED.4IONS-SCNC: 5 MMOL/L (ref 6–16)
BASOPHILS # BLD AUTO: 0.03 K/MM3 (ref 0–0.23)
BASOPHILS # BLD: 0.16 K/MM3 (ref 0–0.23)
BASOPHILS NFR BLD AUTO: 0 % (ref 0–2)
BASOPHILS NFR BLD: 1 % (ref 0–2)
BUN SERPL-MCNC: 54 MG/DL (ref 8–24)
CALCIUM SERPL-MCNC: 7.6 MG/DL (ref 8.5–10.1)
CHLORIDE SERPL-SCNC: 111 MMOL/L (ref 98–108)
CO2 SERPL-SCNC: 26 MMOL/L (ref 21–32)
CREAT SERPL-MCNC: 1.32 MG/DL (ref 0.6–1.2)
DEPRECATED RDW RBC AUTO: 51.7 FL (ref 35.1–46.3)
EOSINOPHIL # BLD AUTO: 0.46 K/MM3 (ref 0–0.68)
EOSINOPHIL # BLD: 0.48 K/MM3 (ref 0–0.68)
EOSINOPHIL NFR BLD AUTO: 3 % (ref 0–6)
EOSINOPHIL NFR BLD: 3 % (ref 0–6)
ERYTHROCYTE [DISTWIDTH] IN BLOOD BY AUTOMATED COUNT: 14.2 % (ref 11.7–14.2)
GLUCOSE SERPL-MCNC: 145 MG/DL (ref 70–99)
HCT VFR BLD AUTO: 21.4 % (ref 37–53)
HCT VFR BLD AUTO: 22 % (ref 37–53)
HCT VFR BLD AUTO: 24.3 % (ref 37–53)
HGB BLD-MCNC: 7 G/DL (ref 13.5–17.5)
HGB BLD-MCNC: 7 G/DL (ref 13.5–17.5)
HGB BLD-MCNC: 8 G/DL (ref 13.5–17.5)
IMM GRANULOCYTES # BLD AUTO: 1.41 K/MM3 (ref 0–0.1)
IMM GRANULOCYTES NFR BLD AUTO: 9 % (ref 0–1)
LYMPHOCYTES # BLD AUTO: 0.82 K/MM3 (ref 0.84–5.2)
LYMPHOCYTES # BLD: 0.64 K/MM3 (ref 0.84–5.2)
LYMPHOCYTES NFR BLD AUTO: 5 % (ref 21–46)
LYMPHOCYTES NFR BLD: 4 % (ref 21–46)
MCHC RBC AUTO-ENTMCNC: 31.8 G/DL (ref 31.5–36.5)
MCV RBC AUTO: 101 FL (ref 80–100)
METAMYELOCYTES # BLD MANUAL: 0.32 K/MM3 (ref 0–0)
METAMYELOCYTES NFR BLD MANUAL: 2 % (ref 0–0)
MONOCYTES # BLD AUTO: 1.01 K/MM3 (ref 0.16–1.47)
MONOCYTES # BLD: 0.8 K/MM3 (ref 0.16–1.47)
MONOCYTES NFR BLD AUTO: 6 % (ref 4–13)
MONOCYTES NFR BLD: 5 % (ref 4–13)
MYELOCYTES # BLD MANUAL: 0.32 K/MM3 (ref 0–0)
MYELOCYTES NFR BLD MANUAL: 2 % (ref 0–0)
NEUTROPHILS # BLD AUTO: 12.3 K/MM3 (ref 1.96–9.15)
NEUTROPHILS NFR BLD AUTO: 77 % (ref 41–73)
NEUTS BAND NFR BLD MANUAL: 5 % (ref 0–8)
NEUTS SEG # BLD MANUAL: 13.14 K/MM3 (ref 1.96–9.15)
NEUTS SEG NFR BLD MANUAL: 77 % (ref 41–73)
NRBC # BLD AUTO: 0.09 K/MM3 (ref 0–0.02)
NRBC BLD AUTO-RTO: 0.6 /100 WBC (ref 0–0.2)
PLATELET # BLD AUTO: 148 K/MM3 (ref 150–400)
POTASSIUM SERPL-SCNC: 4.8 MMOL/L (ref 3.5–5.5)
PROMYELOCYTES # BLD MANUAL: 0.16 K/MM3 (ref 0–0)
PROMYELOCYTES NFR BLD MANUAL: 1 % (ref 0–0)
SODIUM SERPL-SCNC: 142 MMOL/L (ref 136–145)
TOTAL CELLS COUNTED BLD: 100

## 2019-02-24 PROCEDURE — 30233N1 TRANSFUSION OF NONAUTOLOGOUS RED BLOOD CELLS INTO PERIPHERAL VEIN, PERCUTANEOUS APPROACH: ICD-10-PCS | Performed by: SURGERY

## 2019-02-24 NOTE — NUR
SUMMARY
 PT WITH NO C/O NAUSEA TONIGHT. NO BM PASSED.PT REPORTS NO URGE TO VOID.
BLADDER SCAN WAS OBTAINED  AND STRAIGHT CATH WAS PLACED WITH 600 ML
RETURN. PT CONT WITH HICCUPS. HOB REMAINS UP. PT REPORTS PCA EFFECTIVE FOR
PAIN CONTROL. WOUND VAC ITNACT WITH SX.

## 2019-02-24 NOTE — NUR
PT FAMILY AT BEDSIDE REVIEWED LABS POST TRANSFUSION PT WIFE ASKED WHEN HE CAN
START CL PT ONLY TAKING IN SMALL AMTS OF ICE CHIPS WITH NGT ON LOW INT SX

## 2019-02-24 NOTE — NUR
ICE CHIPS REQ GIVEN PT REPORTS ABD PAIN 1/10 NO NAUSEA AT THIS TIME STILL
HAVING CONT HICCUPS PT STILL UNABLE TO VOID NO URGE TO VOID WILL MOBILIZE TO
THE CHAIR IN AN HR AN ATTEMPT TO VOID WILL BLADDER SCAN IF UNABLE TO GO WILL
PLACE HAGER AND CALL  TO START BLADDER TRAINING

## 2019-02-24 NOTE — NUR
assisted pt oob to recliner chair hartley cath placed earlier after talking with
dr carroll to transfuse 1 unit prbc pt son at bedside

## 2019-02-25 LAB
ANION GAP SERPL CALCULATED.4IONS-SCNC: 9 MMOL/L (ref 6–16)
BASOPHILS # BLD: 0 K/MM3 (ref 0–0.23)
BASOPHILS NFR BLD: 0 % (ref 0–2)
BUN SERPL-MCNC: 58 MG/DL (ref 8–24)
CALCIUM SERPL-MCNC: 7.6 MG/DL (ref 8.5–10.1)
CHLORIDE SERPL-SCNC: 110 MMOL/L (ref 98–108)
CO2 SERPL-SCNC: 24 MMOL/L (ref 21–32)
CREAT SERPL-MCNC: 1.44 MG/DL (ref 0.6–1.2)
DEPRECATED RDW RBC AUTO: 56.3 FL (ref 35.1–46.3)
EOSINOPHIL # BLD: 0 K/MM3 (ref 0–0.68)
EOSINOPHIL NFR BLD: 0 % (ref 0–6)
ERYTHROCYTE [DISTWIDTH] IN BLOOD BY AUTOMATED COUNT: 15.9 % (ref 11.7–14.2)
GLUCOSE SERPL-MCNC: 190 MG/DL (ref 70–99)
HCT VFR BLD AUTO: 23.3 % (ref 37–53)
HGB BLD-MCNC: 7.6 G/DL (ref 13.5–17.5)
LYMPHOCYTES # BLD: 1.45 K/MM3 (ref 0.84–5.2)
LYMPHOCYTES NFR BLD: 9 % (ref 21–46)
MCHC RBC AUTO-ENTMCNC: 32.6 G/DL (ref 31.5–36.5)
MCV RBC AUTO: 99 FL (ref 80–100)
METAMYELOCYTES # BLD MANUAL: 0.16 K/MM3 (ref 0–0)
METAMYELOCYTES NFR BLD MANUAL: 1 % (ref 0–0)
MONOCYTES # BLD: 0.64 K/MM3 (ref 0.16–1.47)
MONOCYTES NFR BLD: 4 % (ref 4–13)
MYELOCYTES # BLD MANUAL: 0.32 K/MM3 (ref 0–0)
MYELOCYTES NFR BLD MANUAL: 2 % (ref 0–0)
NEUTS BAND NFR BLD MANUAL: 1 % (ref 0–8)
NEUTS SEG # BLD MANUAL: 13.38 K/MM3 (ref 1.96–9.15)
NEUTS SEG NFR BLD MANUAL: 82 % (ref 41–73)
NRBC # BLD AUTO: 0.13 K/MM3 (ref 0–0.02)
NRBC BLD AUTO-RTO: 0.8 /100 WBC (ref 0–0.2)
PLATELET # BLD AUTO: 170 K/MM3 (ref 150–400)
POTASSIUM SERPL-SCNC: 4.9 MMOL/L (ref 3.5–5.5)
PROMYELOCYTES # BLD MANUAL: 0.16 K/MM3 (ref 0–0)
PROMYELOCYTES NFR BLD MANUAL: 1 % (ref 0–0)
SODIUM SERPL-SCNC: 143 MMOL/L (ref 136–145)
TOTAL CELLS COUNTED BLD: 100

## 2019-02-25 NOTE — NUR
PT HAD NO ACUTE CHANGES T/O NIGHT; VSS. LUNGS DIM IN BASES, PT DOES REP OCC
SOB R/T POSITIONING. PT DOES HAVE PROD COUGH, IS USING YANKAUER SX AT BEDSIDE.
NGT DRNG DARK GREEN FLUID. PT DRESSINGS INTACT. PT IS PASSING FLATUS, FOELY
PATANT. PT IS ASSISTING W/REPOSIGIONING IN BED, IS USING CALL LIGHT FOR
ASSISTANCE, WILL CONT TO MONITOR UNTIL REP GIVEN TO ONCOMING RN.

## 2019-02-26 LAB
ANION GAP SERPL CALCULATED.4IONS-SCNC: 7 MMOL/L (ref 6–16)
BASOPHILS # BLD: 0 K/MM3 (ref 0–0.23)
BASOPHILS NFR BLD: 0 % (ref 0–2)
BUN SERPL-MCNC: 61 MG/DL (ref 8–24)
CALCIUM SERPL-MCNC: 7.9 MG/DL (ref 8.5–10.1)
CHLORIDE SERPL-SCNC: 112 MMOL/L (ref 98–108)
CO2 SERPL-SCNC: 25 MMOL/L (ref 21–32)
CREAT SERPL-MCNC: 1.42 MG/DL (ref 0.6–1.2)
DEPRECATED RDW RBC AUTO: 57.5 FL (ref 35.1–46.3)
EOSINOPHIL # BLD: 0.14 K/MM3 (ref 0–0.68)
EOSINOPHIL NFR BLD: 1 % (ref 0–6)
ERYTHROCYTE [DISTWIDTH] IN BLOOD BY AUTOMATED COUNT: 15.5 % (ref 11.7–14.2)
GLUCOSE SERPL-MCNC: 222 MG/DL (ref 70–99)
HCT VFR BLD AUTO: 22.7 % (ref 37–53)
HGB BLD-MCNC: 7.2 G/DL (ref 13.5–17.5)
LYMPHOCYTES # BLD: 0.74 K/MM3 (ref 0.84–5.2)
LYMPHOCYTES NFR BLD: 5 % (ref 21–46)
MCHC RBC AUTO-ENTMCNC: 31.7 G/DL (ref 31.5–36.5)
MCV RBC AUTO: 102 FL (ref 80–100)
METAMYELOCYTES # BLD MANUAL: 0.44 K/MM3 (ref 0–0)
METAMYELOCYTES NFR BLD MANUAL: 3 % (ref 0–0)
MONOCYTES # BLD: 1.49 K/MM3 (ref 0.16–1.47)
MONOCYTES NFR BLD: 10 % (ref 4–13)
MYELOCYTES # BLD MANUAL: 0.29 K/MM3 (ref 0–0)
MYELOCYTES NFR BLD MANUAL: 2 % (ref 0–0)
NEUTS BAND NFR BLD MANUAL: 1 % (ref 0–8)
NEUTS SEG # BLD MANUAL: 11.77 K/MM3 (ref 1.96–9.15)
NEUTS SEG NFR BLD MANUAL: 78 % (ref 41–73)
NRBC # BLD AUTO: 0.11 K/MM3 (ref 0–0.02)
NRBC BLD AUTO-RTO: 0.7 /100 WBC (ref 0–0.2)
PLATELET # BLD AUTO: 208 K/MM3 (ref 150–400)
POTASSIUM SERPL-SCNC: 4.7 MMOL/L (ref 3.5–5.5)
SODIUM SERPL-SCNC: 144 MMOL/L (ref 136–145)
TOTAL CELLS COUNTED BLD: 100

## 2019-02-26 NOTE — NUR
SHIFT SUMMARY
PT HAS BEEN WHEEZING T/O SHIFT. WAS WORSE AFTER UNIT OF PRBC, EVEN AFTER
BREATHING TX. MD CALLED AND NEW ORDERS RECIEVED. PT STATES HE FEELS BETTER
TONIGHT AT SHIFT CHANGE BUT CONTINUES TO BE WHEEZY. COUGHS AND DEEP BREAATHES
WHEN ASKED TO. USES IS. PAIN WELL MANAGED. NGT HAD INCREASED OUTPUT TODAY.

## 2019-02-27 LAB
ALBUMIN SERPL BCP-MCNC: 1.7 G/DL (ref 3.4–5)
ALBUMIN/GLOB SERPL: 0.4 {RATIO} (ref 0.8–1.8)
ALT SERPL W P-5'-P-CCNC: 31 U/L (ref 12–78)
ANION GAP SERPL CALCULATED.4IONS-SCNC: 7 MMOL/L (ref 6–16)
AST SERPL W P-5'-P-CCNC: 37 U/L (ref 12–37)
BASOPHILS # BLD AUTO: 0.05 K/MM3 (ref 0–0.23)
BASOPHILS NFR BLD AUTO: 0 % (ref 0–2)
BILIRUB SERPL-MCNC: 1.1 MG/DL (ref 0.1–1)
BUN SERPL-MCNC: 59 MG/DL (ref 8–24)
CALCIUM SERPL-MCNC: 7.6 MG/DL (ref 8.5–10.1)
CHLORIDE SERPL-SCNC: 112 MMOL/L (ref 98–108)
CO2 SERPL-SCNC: 24 MMOL/L (ref 21–32)
CREAT SERPL-MCNC: 1.41 MG/DL (ref 0.6–1.2)
DEPRECATED RDW RBC AUTO: 56.1 FL (ref 35.1–46.3)
EOSINOPHIL # BLD AUTO: 0.12 K/MM3 (ref 0–0.68)
EOSINOPHIL NFR BLD AUTO: 1 % (ref 0–6)
ERYTHROCYTE [DISTWIDTH] IN BLOOD BY AUTOMATED COUNT: 15.8 % (ref 11.7–14.2)
GLOBULIN SER CALC-MCNC: 3.9 G/DL (ref 2.2–4)
GLUCOSE SERPL-MCNC: 130 MG/DL (ref 70–99)
HCT VFR BLD AUTO: 24.9 % (ref 37–53)
HGB BLD-MCNC: 8.1 G/DL (ref 13.5–17.5)
IMM GRANULOCYTES # BLD AUTO: 0.67 K/MM3 (ref 0–0.1)
IMM GRANULOCYTES NFR BLD AUTO: 5 % (ref 0–1)
LYMPHOCYTES # BLD AUTO: 0.59 K/MM3 (ref 0.84–5.2)
LYMPHOCYTES NFR BLD AUTO: 4 % (ref 21–46)
MAGNESIUM SERPL-MCNC: 2.1 MG/DL (ref 1.6–2.4)
MCHC RBC AUTO-ENTMCNC: 32.5 G/DL (ref 31.5–36.5)
MCV RBC AUTO: 100 FL (ref 80–100)
MONOCYTES # BLD AUTO: 1.59 K/MM3 (ref 0.16–1.47)
MONOCYTES NFR BLD AUTO: 11 % (ref 4–13)
NEUTROPHILS # BLD AUTO: 11.85 K/MM3 (ref 1.96–9.15)
NEUTROPHILS NFR BLD AUTO: 80 % (ref 41–73)
NRBC # BLD AUTO: 0.1 K/MM3 (ref 0–0.02)
NRBC BLD AUTO-RTO: 0.7 /100 WBC (ref 0–0.2)
PHOSPHATE SERPL-MCNC: 4 MG/DL (ref 2.5–4.9)
PLATELET # BLD AUTO: 239 K/MM3 (ref 150–400)
POTASSIUM SERPL-SCNC: 4.7 MMOL/L (ref 3.5–5.5)
PROT SERPL-MCNC: 5.6 G/DL (ref 6.4–8.2)
SODIUM SERPL-SCNC: 143 MMOL/L (ref 136–145)

## 2019-02-27 NOTE — NUR
AT ABOUT 1945 HEAD TO TOE ASSESSMENT COMPLETED AND BRUISED AND SWOLLEN L UPPER
ARM NOTED SURROUNDING PICC LINE SITE. SADIQ MARTINEZ RN CAME TO BEDSIDE TO ALSO
ASSESS ARM. PER MARIELOS MARTINEZ BRUISING HAS GONE DOWN, BUT THE SWELLING IN ARM HAS
INCREASED. TPN HELD AT THIS TIME AND HEPARIN DRIP LINE MOVED TO PT'S R ARM
POWERGLIDE LINE. BELLE HUNT NOTIFIED. MORPHINE PCA NOT COMPATABLE WITH HEPARIN.
NEW ORDER FOR PRN IV MORPHINE,PCA MORPHINE KIN. NO FLUIDS RUNNING THROUGH
PICC LINE. WILL CTM PT STATUS

## 2019-02-27 NOTE — NUR
SHIFT SUMMARY
PT HAS FELT BETTER TODAY. NO WHEEZES NOTED UNLESS LAYING FLAT FOR
RESPOSITIONING. PT DEEP BREATHES AND COUGHS WELL. PICC LINE REMOVED DUE TO
HARDNESS/SWELLING. ARM HAS BEEN SOFT AND NONTENDER SINCE REMOVAL. DISCUSSED
BM'S & SMEAR WITH DR JAVED, WHICH HE SAID HE SAID WAS EXPECTED. PT DID NOT
TOLERATE NGT CLAMPED FOR 6 HOURS ALTHOUGH < 25 MLS WAS OUT AFTER REHOOKED TO
SUCTION. WILL LEAVE MGT IN PLACE UNTIL TOMORROW AFTER DISCUSSION WITH SURGEON.

## 2019-02-28 LAB
ALBUMIN SERPL BCP-MCNC: 1.8 G/DL (ref 3.4–5)
ALBUMIN/GLOB SERPL: 0.5 {RATIO} (ref 0.8–1.8)
ALT SERPL W P-5'-P-CCNC: 33 U/L (ref 12–78)
ANION GAP SERPL CALCULATED.4IONS-SCNC: 9 MMOL/L (ref 6–16)
AST SERPL W P-5'-P-CCNC: 30 U/L (ref 12–37)
BASOPHILS # BLD AUTO: 0.03 K/MM3 (ref 0–0.23)
BASOPHILS NFR BLD AUTO: 0 % (ref 0–2)
BILIRUB SERPL-MCNC: 0.9 MG/DL (ref 0.1–1)
BUN SERPL-MCNC: 60 MG/DL (ref 8–24)
CALCIUM SERPL-MCNC: 7.8 MG/DL (ref 8.5–10.1)
CHLORIDE SERPL-SCNC: 111 MMOL/L (ref 98–108)
CO2 SERPL-SCNC: 25 MMOL/L (ref 21–32)
CREAT SERPL-MCNC: 1.54 MG/DL (ref 0.6–1.2)
DEPRECATED RDW RBC AUTO: 55.9 FL (ref 35.1–46.3)
EOSINOPHIL # BLD AUTO: 0.17 K/MM3 (ref 0–0.68)
EOSINOPHIL NFR BLD AUTO: 2 % (ref 0–6)
ERYTHROCYTE [DISTWIDTH] IN BLOOD BY AUTOMATED COUNT: 15.6 % (ref 11.7–14.2)
GLOBULIN SER CALC-MCNC: 3.9 G/DL (ref 2.2–4)
GLUCOSE SERPL-MCNC: 153 MG/DL (ref 70–99)
HCT VFR BLD AUTO: 23.7 % (ref 37–53)
HGB BLD-MCNC: 7.6 G/DL (ref 13.5–17.5)
IMM GRANULOCYTES # BLD AUTO: 0.45 K/MM3 (ref 0–0.1)
IMM GRANULOCYTES NFR BLD AUTO: 4 % (ref 0–1)
LYMPHOCYTES # BLD AUTO: 0.65 K/MM3 (ref 0.84–5.2)
LYMPHOCYTES NFR BLD AUTO: 6 % (ref 21–46)
MCHC RBC AUTO-ENTMCNC: 31.9 G/DL (ref 31.5–36.5)
MCV RBC AUTO: 100 FL (ref 80–100)
MONOCYTES # BLD AUTO: 1.15 K/MM3 (ref 0.16–1.47)
MONOCYTES NFR BLD AUTO: 10 % (ref 4–13)
NEUTROPHILS # BLD AUTO: 9.18 K/MM3 (ref 1.96–9.15)
NEUTROPHILS NFR BLD AUTO: 79 % (ref 41–73)
NRBC # BLD AUTO: 0.06 K/MM3 (ref 0–0.02)
NRBC BLD AUTO-RTO: 0.5 /100 WBC (ref 0–0.2)
PLATELET # BLD AUTO: 253 K/MM3 (ref 150–400)
POTASSIUM SERPL-SCNC: 3.9 MMOL/L (ref 3.5–5.5)
PROT SERPL-MCNC: 5.7 G/DL (ref 6.4–8.2)
SODIUM SERPL-SCNC: 145 MMOL/L (ref 136–145)

## 2019-02-28 NOTE — NUR
DOCTOR TELLO IN TO SEE; NG TUBE D/C'D. PATIENT HAS BEEN UP TO CHAIR FOR > AN
HOUR; NOW BACK TO BED. DENIES PAIN OR ABD DISCOMFORT.

## 2019-02-28 NOTE — NUR
SHIFT SUMMARY
PATIENT UP IN CHAIR LATE THIS AM, TOLERATED WELL. PATIENT TAKING CL W/O
C/O PAIN OR NAUSEA. SLEEPING INTERMITANTLY THIS AFTERNOON. IVF INFUSING
PER ORDER. HEPARIN GTT INFUSING AT 17 U/ HR (26.2 ML/HR.) VSS. NO C/O. FAMILY
IN TO SEE.

## 2019-03-01 LAB
BASOPHILS # BLD AUTO: 0.03 K/MM3 (ref 0–0.23)
BASOPHILS NFR BLD AUTO: 0 % (ref 0–2)
DEPRECATED RDW RBC AUTO: 55.1 FL (ref 35.1–46.3)
EOSINOPHIL # BLD AUTO: 0.15 K/MM3 (ref 0–0.68)
EOSINOPHIL NFR BLD AUTO: 1 % (ref 0–6)
ERYTHROCYTE [DISTWIDTH] IN BLOOD BY AUTOMATED COUNT: 15.4 % (ref 11.7–14.2)
HCT VFR BLD AUTO: 25.7 % (ref 37–53)
HGB BLD-MCNC: 8.2 G/DL (ref 13.5–17.5)
IMM GRANULOCYTES # BLD AUTO: 0.36 K/MM3 (ref 0–0.1)
IMM GRANULOCYTES NFR BLD AUTO: 3 % (ref 0–1)
LYMPHOCYTES # BLD AUTO: 0.69 K/MM3 (ref 0.84–5.2)
LYMPHOCYTES NFR BLD AUTO: 7 % (ref 21–46)
MCHC RBC AUTO-ENTMCNC: 31.9 G/DL (ref 31.5–36.5)
MCV RBC AUTO: 100 FL (ref 80–100)
MONOCYTES # BLD AUTO: 1.08 K/MM3 (ref 0.16–1.47)
MONOCYTES NFR BLD AUTO: 10 % (ref 4–13)
NEUTROPHILS # BLD AUTO: 8.32 K/MM3 (ref 1.96–9.15)
NEUTROPHILS NFR BLD AUTO: 78 % (ref 41–73)
NRBC # BLD AUTO: 0.05 K/MM3 (ref 0–0.02)
NRBC BLD AUTO-RTO: 0.5 /100 WBC (ref 0–0.2)
PLATELET # BLD AUTO: 269 K/MM3 (ref 150–400)

## 2019-03-01 NOTE — NUR
SHIFT SUMMARY
PATIENT TOOK MOST OF CLEAR LIQUID TRAY THIS AM, THOUGH CAUTIONED TO TAKE VERY
SLOWLY. HE THEN C/O FEELING DISTENDED. DECLINED LUNCH AND DINNER TRAYS. NOW
STATES HE IS FEELING LESS DISTENDED. DENIED NAUSEA, PAIN. UP TO CHAIR AND
WORKED W/PT X 2, DOING EXERCISES; VERY EXHAUSTED AFTERWARDS.
ASSISTED WITH URINAL THIS RUBI, MISSED, VOIDED IN ATTENDS. HAD SMALL BM X2,
BROWN. MEDICATED FOR HICCUPS X 1. FAMILY IN TO SEE T/O SHIFT.

## 2019-03-01 NOTE — NUR
HEPARIN GTT STOPPED AT THIS TIME PER ORDER. PATIENT TOLERATED CL PO FOR AM
MEAL, THEN STATED HE WAS 'FULL' BUT DENIED PAIN/NAUSEA. TOOK VERY LITTLE FL
LUNCH. PT IN THIS AM, PATIENT ABLE TO STAND AND DO EXERCISES IN CHAIR. NO C/O
AT THIS TIME. SON IN TO SEE.

## 2019-03-01 NOTE — NUR
SUMMARY
HEP DRIP CONTINUED LABS NEXT SCHED FOR 0800. PT CONT WITH SOB WITH ACTIVITY.
DR JAVED HER THIS M AND AWARE. PT IS DECONDITIONED.I ASKED DR ABOUT POSSIBLE
PT CONSULT AND ? ALLEY ALTHOUGH PT WITH HX RETENTION AND IS ON IV LASIX WITH
GENERALIZED EDEMA. HE WILL DEFER THIS TO HOSPITLIST.PT TOLERATING CLR LIQ PO.
HAD LIQ BM THIS AM WITH NO BLOODY APPEARANCE.

## 2019-03-02 LAB
ANION GAP SERPL CALCULATED.4IONS-SCNC: 9 MMOL/L (ref 6–16)
BASOPHILS # BLD AUTO: 0.03 K/MM3 (ref 0–0.23)
BASOPHILS NFR BLD AUTO: 0 % (ref 0–2)
BUN SERPL-MCNC: 54 MG/DL (ref 8–24)
CALCIUM SERPL-MCNC: 7.6 MG/DL (ref 8.5–10.1)
CHLORIDE SERPL-SCNC: 109 MMOL/L (ref 98–108)
CO2 SERPL-SCNC: 23 MMOL/L (ref 21–32)
CREAT SERPL-MCNC: 1.27 MG/DL (ref 0.6–1.2)
DEPRECATED RDW RBC AUTO: 54.7 FL (ref 35.1–46.3)
EOSINOPHIL # BLD AUTO: 0.1 K/MM3 (ref 0–0.68)
EOSINOPHIL NFR BLD AUTO: 1 % (ref 0–6)
ERYTHROCYTE [DISTWIDTH] IN BLOOD BY AUTOMATED COUNT: 15.2 % (ref 11.7–14.2)
GLUCOSE SERPL-MCNC: 171 MG/DL (ref 70–99)
HCT VFR BLD AUTO: 24.2 % (ref 37–53)
HGB BLD-MCNC: 7.6 G/DL (ref 13.5–17.5)
IMM GRANULOCYTES # BLD AUTO: 0.26 K/MM3 (ref 0–0.1)
IMM GRANULOCYTES NFR BLD AUTO: 2 % (ref 0–1)
LYMPHOCYTES # BLD AUTO: 0.76 K/MM3 (ref 0.84–5.2)
LYMPHOCYTES NFR BLD AUTO: 7 % (ref 21–46)
MCHC RBC AUTO-ENTMCNC: 31.4 G/DL (ref 31.5–36.5)
MCV RBC AUTO: 100 FL (ref 80–100)
MONOCYTES # BLD AUTO: 0.95 K/MM3 (ref 0.16–1.47)
MONOCYTES NFR BLD AUTO: 9 % (ref 4–13)
NEUTROPHILS # BLD AUTO: 8.53 K/MM3 (ref 1.96–9.15)
NEUTROPHILS NFR BLD AUTO: 80 % (ref 41–73)
NRBC # BLD AUTO: 0.03 K/MM3 (ref 0–0.02)
NRBC BLD AUTO-RTO: 0.3 /100 WBC (ref 0–0.2)
PLATELET # BLD AUTO: 244 K/MM3 (ref 150–400)
POTASSIUM SERPL-SCNC: 3.8 MMOL/L (ref 3.5–5.5)
SODIUM SERPL-SCNC: 141 MMOL/L (ref 136–145)

## 2019-03-03 LAB
ALBUMIN SERPL BCP-MCNC: 1.7 G/DL (ref 3.4–5)
ANION GAP SERPL CALCULATED.4IONS-SCNC: 8 MMOL/L (ref 6–16)
BASOPHILS # BLD AUTO: 0.02 K/MM3 (ref 0–0.23)
BASOPHILS NFR BLD AUTO: 0 % (ref 0–2)
BUN SERPL-MCNC: 51 MG/DL (ref 8–24)
CALCIUM SERPL-MCNC: 7.6 MG/DL (ref 8.5–10.1)
CHLORIDE SERPL-SCNC: 110 MMOL/L (ref 98–108)
CO2 SERPL-SCNC: 22 MMOL/L (ref 21–32)
CREAT SERPL-MCNC: 1.25 MG/DL (ref 0.6–1.2)
DEPRECATED RDW RBC AUTO: 53.7 FL (ref 35.1–46.3)
EOSINOPHIL # BLD AUTO: 0.12 K/MM3 (ref 0–0.68)
EOSINOPHIL NFR BLD AUTO: 1 % (ref 0–6)
ERYTHROCYTE [DISTWIDTH] IN BLOOD BY AUTOMATED COUNT: 14.8 % (ref 11.7–14.2)
GLUCOSE SERPL-MCNC: 142 MG/DL (ref 70–99)
HCT VFR BLD AUTO: 24.4 % (ref 37–53)
HGB BLD-MCNC: 7.6 G/DL (ref 13.5–17.5)
IMM GRANULOCYTES # BLD AUTO: 0.29 K/MM3 (ref 0–0.1)
IMM GRANULOCYTES NFR BLD AUTO: 3 % (ref 0–1)
LYMPHOCYTES # BLD AUTO: 0.58 K/MM3 (ref 0.84–5.2)
LYMPHOCYTES NFR BLD AUTO: 6 % (ref 21–46)
MAGNESIUM SERPL-MCNC: 2.4 MG/DL (ref 1.6–2.4)
MCHC RBC AUTO-ENTMCNC: 31.1 G/DL (ref 31.5–36.5)
MCV RBC AUTO: 100 FL (ref 80–100)
MONOCYTES # BLD AUTO: 0.85 K/MM3 (ref 0.16–1.47)
MONOCYTES NFR BLD AUTO: 8 % (ref 4–13)
NEUTROPHILS # BLD AUTO: 8.24 K/MM3 (ref 1.96–9.15)
NEUTROPHILS NFR BLD AUTO: 82 % (ref 41–73)
NRBC # BLD AUTO: 0.03 K/MM3 (ref 0–0.02)
NRBC BLD AUTO-RTO: 0.3 /100 WBC (ref 0–0.2)
PHOSPHATE SERPL-MCNC: 4 MG/DL (ref 2.5–4.9)
PLATELET # BLD AUTO: 249 K/MM3 (ref 150–400)
POTASSIUM SERPL-SCNC: 4.1 MMOL/L (ref 3.5–5.5)
SODIUM SERPL-SCNC: 140 MMOL/L (ref 136–145)

## 2019-03-03 NOTE — NUR
SHIFT SUMMARY
PT HAS DONE WELL THIS SHIFT. UP WITH THERAPY AND WAS UP TO CHAIR FOR MAJORITY
OF SHIFT. MIDLINE DRESSING C/D/I. ADVANCED TO REGULAR DIET THIS EVENING,
TOLERATED WELL. DENIES PAIN.

## 2019-03-04 LAB
ALBUMIN SERPL BCP-MCNC: 1.8 G/DL (ref 3.4–5)
ANION GAP SERPL CALCULATED.4IONS-SCNC: 9 MMOL/L (ref 6–16)
BASOPHILS # BLD AUTO: 0.01 K/MM3 (ref 0–0.23)
BASOPHILS NFR BLD AUTO: 0 % (ref 0–2)
BUN SERPL-MCNC: 46 MG/DL (ref 8–24)
CALCIUM SERPL-MCNC: 7.6 MG/DL (ref 8.5–10.1)
CHLORIDE SERPL-SCNC: 110 MMOL/L (ref 98–108)
CO2 SERPL-SCNC: 21 MMOL/L (ref 21–32)
CREAT SERPL-MCNC: 1.22 MG/DL (ref 0.6–1.2)
DEPRECATED RDW RBC AUTO: 53.1 FL (ref 35.1–46.3)
EOSINOPHIL # BLD AUTO: 0.13 K/MM3 (ref 0–0.68)
EOSINOPHIL NFR BLD AUTO: 2 % (ref 0–6)
ERYTHROCYTE [DISTWIDTH] IN BLOOD BY AUTOMATED COUNT: 14.6 % (ref 11.7–14.2)
GLUCOSE SERPL-MCNC: 135 MG/DL (ref 70–99)
HCT VFR BLD AUTO: 24.1 % (ref 37–53)
HGB BLD-MCNC: 7.3 G/DL (ref 13.5–17.5)
IMM GRANULOCYTES # BLD AUTO: 0.19 K/MM3 (ref 0–0.1)
IMM GRANULOCYTES NFR BLD AUTO: 2 % (ref 0–1)
LYMPHOCYTES # BLD AUTO: 0.47 K/MM3 (ref 0.84–5.2)
LYMPHOCYTES NFR BLD AUTO: 5 % (ref 21–46)
MAGNESIUM SERPL-MCNC: 2.3 MG/DL (ref 1.6–2.4)
MCHC RBC AUTO-ENTMCNC: 30.3 G/DL (ref 31.5–36.5)
MCV RBC AUTO: 100 FL (ref 80–100)
MONOCYTES # BLD AUTO: 0.7 K/MM3 (ref 0.16–1.47)
MONOCYTES NFR BLD AUTO: 8 % (ref 4–13)
NEUTROPHILS # BLD AUTO: 7.32 K/MM3 (ref 1.96–9.15)
NEUTROPHILS NFR BLD AUTO: 83 % (ref 41–73)
NRBC # BLD AUTO: 0.04 K/MM3 (ref 0–0.02)
NRBC BLD AUTO-RTO: 0.5 /100 WBC (ref 0–0.2)
PHOSPHATE SERPL-MCNC: 3.8 MG/DL (ref 2.5–4.9)
PLATELET # BLD AUTO: 227 K/MM3 (ref 150–400)
POTASSIUM SERPL-SCNC: 4.2 MMOL/L (ref 3.5–5.5)
SODIUM SERPL-SCNC: 140 MMOL/L (ref 136–145)

## 2019-03-04 NOTE — NUR
ss at bedside asking pt about snf placement pt refusing to go stated he has
family that can help at home

## 2019-03-04 NOTE — NUR
dr parr by to see pt dressing removed abd soft no pain no nausea having soft
brown tan stool small in size but mult reported per pt dr parr req a new
dressing to be applied if pt is wearing attends to keep it dry to prevent
soiling it pt stated his hiccups went away a few days ago acacia his reg diet

## 2019-03-04 NOTE — NUR
SHIFT SUMMARY:
NO ACUTE CHANGES THIS SHIFT. 1 PER ASSIST TO BATHROOM; BED TEMPLE PER REQUEST. PT
HAS SEVERAL LIQUID BM'S; C DIFF NEG. ON ROOM AIR. SOB c EXERTION. PT ADVANCED
FROM FULL LIQUID TO REGULAR; TOLERATING WELL; DENIES N/V. MIDLINE INCISION
C/D/I. PT DENIES PAIN OR DISCOMFORT. MILD ABDOMEN DISTENTION. CLINIMIX
RUNNING @ 100 ML/HR. H/H LOW THIS AM AT 7.3/24. NO OTHER CHANGES TO REPORT.
WILL CONT TO MONITOR AND PROVIDE CARE UNTIL PRESUMED BY ONCOMING RN.

## 2019-03-05 LAB
ALBUMIN SERPL BCP-MCNC: 1.8 G/DL (ref 3.4–5)
ANION GAP SERPL CALCULATED.4IONS-SCNC: 8 MMOL/L (ref 6–16)
BUN SERPL-MCNC: 44 MG/DL (ref 8–24)
CALCIUM SERPL-MCNC: 7.6 MG/DL (ref 8.5–10.1)
CHLORIDE SERPL-SCNC: 110 MMOL/L (ref 98–108)
CO2 SERPL-SCNC: 22 MMOL/L (ref 21–32)
CREAT SERPL-MCNC: 1.2 MG/DL (ref 0.6–1.2)
GLUCOSE SERPL-MCNC: 133 MG/DL (ref 70–99)
HCT VFR BLD AUTO: 27.2 % (ref 37–53)
HGB BLD-MCNC: 8.7 G/DL (ref 13.5–17.5)
PHOSPHATE SERPL-MCNC: 3.5 MG/DL (ref 2.5–4.9)
POTASSIUM SERPL-SCNC: 4.4 MMOL/L (ref 3.5–5.5)
SODIUM SERPL-SCNC: 140 MMOL/L (ref 136–145)

## 2019-03-05 NOTE — NUR
S/P EXP LAP WITH RESECTION. NO SIG CHANGES. HAS BEEN GETTING UP WITH JUST ONE
PERSON ASSIST. DENIES ANY PAIN. STILL NEEDS TO INCREASE PO INTAKE. CONT IV
LIPIDS/CLINIMIX. PT IS HOPEFULL TO DC HOME SOON. H/H HAS IMPROVED THIS AM.
CALL LIGHT IN REACH.

## 2019-03-05 NOTE — NUR
PT REQ STRAWBERRY ENSURE INSTEAD OF RUDY STATED HE TRIED TO EAT MORE LUCH AND
FEELS BLOATED 2 HRS AFTER NO BM YET NO NAUSEA

## 2019-03-05 NOTE — NUR
dr parr by to see pt multiple loose bm thru the night unsure if pt should
discharge today also talked with dr parr re poor appeatite est 25 % yesterday
for all meals worried about re admission

## 2019-03-06 LAB
ALBUMIN SERPL BCP-MCNC: 1.8 G/DL (ref 3.4–5)
ANION GAP SERPL CALCULATED.4IONS-SCNC: 10 MMOL/L (ref 6–16)
BASOPHILS # BLD AUTO: 0.01 K/MM3 (ref 0–0.23)
BASOPHILS NFR BLD AUTO: 0 % (ref 0–2)
BUN SERPL-MCNC: 45 MG/DL (ref 8–24)
CALCIUM SERPL-MCNC: 7.6 MG/DL (ref 8.5–10.1)
CHLORIDE SERPL-SCNC: 108 MMOL/L (ref 98–108)
CO2 SERPL-SCNC: 21 MMOL/L (ref 21–32)
CREAT SERPL-MCNC: 1.17 MG/DL (ref 0.6–1.2)
DEPRECATED RDW RBC AUTO: 56.6 FL (ref 35.1–46.3)
EOSINOPHIL # BLD AUTO: 0.04 K/MM3 (ref 0–0.68)
EOSINOPHIL NFR BLD AUTO: 1 % (ref 0–6)
ERYTHROCYTE [DISTWIDTH] IN BLOOD BY AUTOMATED COUNT: 15.9 % (ref 11.7–14.2)
GLUCOSE SERPL-MCNC: 138 MG/DL (ref 70–99)
HCT VFR BLD AUTO: 40.4 % (ref 37–53)
HGB BLD-MCNC: 12.9 G/DL (ref 13.5–17.5)
IMM GRANULOCYTES # BLD AUTO: 0.06 K/MM3 (ref 0–0.1)
IMM GRANULOCYTES NFR BLD AUTO: 1 % (ref 0–1)
LYMPHOCYTES # BLD AUTO: 0.25 K/MM3 (ref 0.84–5.2)
LYMPHOCYTES NFR BLD AUTO: 6 % (ref 21–46)
MAGNESIUM SERPL-MCNC: 2.2 MG/DL (ref 1.6–2.4)
MCHC RBC AUTO-ENTMCNC: 31.9 G/DL (ref 31.5–36.5)
MCV RBC AUTO: 98 FL (ref 80–100)
MONOCYTES # BLD AUTO: 0.41 K/MM3 (ref 0.16–1.47)
MONOCYTES NFR BLD AUTO: 9 % (ref 4–13)
NEUTROPHILS # BLD AUTO: 3.77 K/MM3 (ref 1.96–9.15)
NEUTROPHILS NFR BLD AUTO: 83 % (ref 41–73)
NRBC # BLD AUTO: 0.02 K/MM3 (ref 0–0.02)
NRBC BLD AUTO-RTO: 0.4 /100 WBC (ref 0–0.2)
PHOSPHATE SERPL-MCNC: 4 MG/DL (ref 2.5–4.9)
PLATELET # BLD AUTO: 127 K/MM3 (ref 150–400)
POTASSIUM SERPL-SCNC: 4.5 MMOL/L (ref 3.5–5.5)
SODIUM SERPL-SCNC: 139 MMOL/L (ref 136–145)

## 2019-03-06 NOTE — NUR
SHIFT SUMMARY
 
NO ACUTE CHANGES THIS SHIFT. PT DENIES PAIN. MIDLINE INCISION CDI. UP IN CHAIR
FOR MOST OF SHIFT. SBA USING WALKER TO RESTROOM. ENCOURAGING PO INTAKE.
CLINIMIX AND LIPIDS PER ORDERS. WIFE AT BEDSIDE FOR SUPPORT. USES CALL LIGHT
APPROPRIATELY.

## 2019-03-06 NOTE — NUR
SHIFT SUMMARY
PT REMAINS ON FLOOR POST EX LAP AND SMALL BOWEL RESECTION. HE IS A&O, ABLE TO
MAKE NEEDS KNOWN. MIDLINE INCISION HAS A MEDIPORE DRESSING, C/D/I. PT RUNNING
CLINIMIX AND LIPIDS PER ORDERS. POWERGLIDE IN PLACE. PT USES BSC WITH 1
ASSIST. NO PAIN MEDS REQUIRED THIS SHIFT. WILL CTM UNTIL PASS TO NEXT SHIFT.

## 2019-03-07 LAB
ANION GAP SERPL CALCULATED.4IONS-SCNC: 9 MMOL/L (ref 6–16)
BUN SERPL-MCNC: 43 MG/DL (ref 8–24)
CALCIUM SERPL-MCNC: 7.6 MG/DL (ref 8.5–10.1)
CHLORIDE SERPL-SCNC: 109 MMOL/L (ref 98–108)
CO2 SERPL-SCNC: 21 MMOL/L (ref 21–32)
CREAT SERPL-MCNC: 1.19 MG/DL (ref 0.6–1.2)
GLUCOSE SERPL-MCNC: 131 MG/DL (ref 70–99)
HCT VFR BLD AUTO: 25 % (ref 37–53)
HGB BLD-MCNC: 7.8 G/DL (ref 13.5–17.5)
POTASSIUM SERPL-SCNC: 4.4 MMOL/L (ref 3.5–5.5)
SODIUM SERPL-SCNC: 139 MMOL/L (ref 136–145)

## 2019-03-07 NOTE — NUR
SHIFT SUMMARY:
 
NO ACUTE CHANGES OVER NIGHT. PT DENIES N/V AND PAIN. PASSING GAS AND REPORTS
HAVING SOFT BM YESTERDAY. ENC PT TO INCREASE PO INTAKE AND AMBULATE. PT
APPEARS TO BE RECEPTIVE AND MOTIVATED. LIPIDS AND CLINIMIX INFUSING PER EMAR.
PT USING URINAL AT BEDSIDE. VOIDING WELL. PT PLEASENT AND ANXIOUS TO
DISCHARGE.

## 2019-03-07 NOTE — NUR
DISCHARGE
PT HAS DONE WELL TODAY. INCREASE IN PO INTAKE. PLEASANT AND COOPERATIVE.
REPORT CALLED TO [facility].

## 2019-10-13 NOTE — NUR
NEW ER ADMIT THIS AFTERNOON. PT IS A/O X3, PLEASANT AFFECT. DX PLEURAL
EFFUSION. HE STATE NO SOB @ REST @ THIS TIME, BIOX 96% RA. LUNGS DECREASED,
COARSE W CRACKLES L LOBE. HE IS WEAK/FATIGUED, PALE. STATE UNABLE TO AMBULATE
W/O ASSIST @ THIS TIME, USES FWW @ HOME. BLE EDEMA 2+, TEDS PLACED/ORDER. HE
IS SCEDULED FOR CT GUIDED THORACENTESIS IN AM, COAG LABS ORDERED FOR AM, 
STATE TO GIVE XARELTO TONITE. FLUIDS PROVIDED, SUPPORTIVE FAMILY @ BEDSIDE.
VSS @ THIS TIME.

## 2019-10-14 NOTE — NUR
SHIFT SUMMARY
PT IS AN 86 Y/O M, ADMITTED FOR L PLEURAL EFFUSION. HE IS A&O X 3, THOUGH
OCCASIONALLY FORGETFUL. PT IS ON BEDREST. NO COMPLAINTS OF ACUTE PAIN OR
NAUSEA, THOUGH PT DID REPORT INTERMITTENT EPISODES OF INCREASED SOB. PT IS ON
2L OF O2 VIA NC. VITAL SIGNS STABLE. NO OTHER ACUTE CHANGES IN PT CONDITION
NOTED. REPORT GIVEN TO ONCOMING RN.

## 2019-10-14 NOTE — NUR
Initial Visit:
 
Palliative Care Consult for AD/POLST, Advanced Care Planning, and Symptom
Management.
 
Pt is A&OX4 and denies pain at this time. Pt reoprts mild but manageable
dyspnea. He reports dyspnea has improved with oxygen helping. Pt denies
anxiety, depression, and nausea.
 
Engaged in therapeutic discussion. Listened as Pt reports feeling weaker and
experiencing little appetite. Pt admits that he has started giving up.
Discussed the possibility of depression and Pt denies this possibility. Pt
reports plently of support from his wife and children. He is of Religion
jodee and denies need for  visit. He reports members from his [employer]
visit with him and provide spiritual support. Suggested the possibility of
spiralling effect between his weakness and lack of appetite. Pt agrees to this
possibility. Engaged in therpeutic discussion regarding AD/POLST. Pt expresses
interest and states he will complete when his family visits. Educated on POLST
form and each section to complete. Educated on life sustaining measures
including risk factors. Pt is scheculed for thorecentesis tomorrow. Pt is
agreeable with continued visits from palliative care.
 
Spoke with bedside nurse Steff and discussed case. She reports working well
with therapy and was out in the parada ambulating with physical therapy.
 
Palliative Care will remain available.

## 2019-10-14 NOTE — NUR
Spiritual Care inital note:
 
Mr. Berry was sitting in chair and alert.  He appears quite frail and weak.
Too weak for lengthy conversation.  Family at bedside appears loving and
attentive.  Provided prayer for healing at pt request.  I will remain
available to pt and family.

## 2019-10-14 NOTE — NUR
SHIFT SUMMARY
HOLDING XARELTO UNTIL AFTER THORACENTESIS, HOPEFUL TO BE PERFORMED TOMORROW.
STILL NEED STOOL SAMPLE. NO COMPLAINT OF PAIN. CHANGED TO RENAL DIET TODAY.
PHYSICAL THERAPY WORKED WITH PT TODAY AND WALKED HIM IN THE ROOM W/FWW AND
GAIT BELT. PT HAS A LEFT PLEURAL EFFUSION.

## 2019-10-15 NOTE — NUR
Clinical Visit:
 
Call from Esthela, care manager, earlier today. Family wanted to talk about
hospice. Reviewed chart, call placed to Dr. Perdomo. He states that pt does
not appear to be ready for hospice at this time.
 
Pt alert, oriented. He becomes short of breath with speaking. He is laying in
his bed, appears comfortable. Pt reports profound fatigue, diminished
appetite, loss of mobility, weight loss of 45+ in last 6 months, increased
daytime sleepiness. Past medical history of a fib, GERD, bowel resection due
to mesenteric ischemia, weakness, malnutrition.
 
Pt lives at home with his wife, he was walking shortly following his bowel
resection in Feb of this year. He declined SNF and only spent a day there
before he wanted to go home. He was brought home by his family and did a
little bit of physical therapy HH. Family is present. 2 sons and a daughter in
law. They are expressing a wish to have him comfortable at home and not bring
him back to the hospital. Discussed hospice care with them. Recommendation is
for home with home health. He is going to be treated with thoracentesis
tomorrow for his L pleural effusion. Reviewed pt's right to decline
healthcare. He states that he is not declining, he would like the procedure.
He would accept to come back to the hospital if he were "bleeding or
something." Not interested in POLST form. Discussed possible discharge plans,
including transitioning to hospice from  if he qualifies. Pt's existance is
from bed to wheelchair to bathroom and then back to bed. Discussed depression,
s/s. Pt states that he is not depressed, he just doesn't feel like doing
anything.
 
Discussed with Maricruz  and Hospice liason for  [facility]. She states that she
can have a hospice nurse review and see if he qualifies.

## 2019-10-15 NOTE — NUR
SHIFT SUMMARY
A/O, ABLE TO MAKE NEEDS KNOWN. COOPERATIVE WITH CARE. CALLS AND ANSWERS
QUESTIONS APPROPRIATELY. NO C/O PAIN. DID EXPRESS SOME DISCOMFORT IN CHEST;
STATING THAT HE FELT AS THOUGH HE WAS UNABLE TO GET A DEEP BREATH. WE
DISCUSSED HIS DIAGNOSIS AND THAT HE WOULD BE HAVING A PROCEDURE 10/15/19,
WHICH WOULD HOPEFULLY GIVE RELIEF. PATIENT STATED THAT HE JUST WOULD LIKE TO
GET REST DURING THE NIGHT; CALLED ON-CALL AND RECIEVED OT DOSE OF MELATONIN.
APPEARED TO REST SOME OVERNIGHT. NO OTHER ISSUES OVERNIGHT. VSS/AFEBRILE.
REMAINS ON 2L NC WITH EVEN RESPIRATIONS. BED IN LOWEST POSITION. CALL LIGHT
AND BELONGINGS WITHIN REACH. WCTM. REPORT TO ONCOMING RN.

## 2019-10-15 NOTE — NUR
SHIFT SUMMARY.
A&OX3, CGA WITH GB AND FWW TO BS. PT SHOWERED TODAY. PT DENIES PAIN, N/V. PT
REPORTS DIFFICULTY DEEP BREATHING. L LUNG SOARES DIM TO ABSENT. PT CONTINUES
WITH 2L O2 NC. FAMILY AT BEDISDE INTERMITTENTLY THIS SHIFT, SPOKE WITH
PALLITATIVE CARE. NO NEW CHANGES.

## 2019-10-16 NOTE — NUR
PT ALERT AND ORIENTED DURING THIS SHIFT. PT UP IN CHAIR THIS AM FOR BREAKFAST.
PT DOWN FOR A THORACENTESIS THIS AM. PT'S BREATHING MUCH IMPROVED AFTER
PROCEDURE. PT FROM 2L O2 TO ROOM AIR SHORTLY AFTER PROCEDURE, MAINTAINING 96%
O2 ON RA. PT QUICKER TO RESPOND AFTER PROCEDURE. FAMILY IN ROOM THROUGHOUT THE
SHIFT. PT CURRENTLY RESTING IN BED. PT USES CALL LIGHT APPROPRIATELY. CALL
LIGHT IN REACH. WILL CONTINUE TO MONITOR.

## 2019-10-16 NOTE — NUR
BM
PT REPORTS THAT IT HAS BEEN "SEVERAL DAYS" SINCE HE HAS HAD A BOWEL MOVEMENT.
OFFERED BOWEL CARE THIS EVENING AND PT DECLINED. PT STATED THAT HE WOULD LIKE
TO TRY SOMETHING DURING THE DAY TOMORROW IF HE DOESN'T HAVE ONE THIS EVENING.

## 2019-10-16 NOTE — NUR
SHIFT SUMMARY: 88 Y/O MALE PLEASANT AND COOPERATIVE. HAS HAD OFF AND ON
DIFFICULTY BREATHING THROUGHOUT THE NIGHT. HE LIKES TO LAY KEVIN FLAT IN BED
WHICH IN TURN CAUSES HIM SOB. HE COMPLAINED OF THIS OFF AND ON THROUGHOUT THE
NIGHT. GAVE HIM A FAN TO ASSIST AND CONTINUED TO ENCOURAGE HIM TO SIT UP IN
THE BED TO HELP WITH BREATHING. EDUCATED HIM ON HIS DIAGNOSIS AND HOW IT
AFFECTS HIS BREATHING. HE IS TO HAVE PROCEDURE TO HELP RELEIVE THIS ISSUES
ON 10/16/19, THEREFORE MADE HIM NPO JUST IN CASE SOMETHING MORE NEEDS TO BE
DONE. AFTER A WHILE HE DID SIT UP IN BED AND THIS DID RELEIVE SOME OF THE SOB
TO WHERE HE COULD SLEEP A LONGER PERIOD OF TIME. WILL REPORT TO DAY SHIFT RN
OF CHANGES.

## 2019-10-17 NOTE — NUR
PT DISCHARGED TO HOME WITH HOME HEALTH. PT TRANSPORTED HOME BY SON. PT UP TO
WHEELCHAIR WITH ASSIST. PT AND SON PROVIDED WITH DISCHARGE INSTRUCTIONS. PT'S
SON VERBALIZED UNDERSTANDING OF DISCHARGE INSTRUCTIONS. PT STATES THAT HE IS
FEELING MUCH BETTER NOW THAT HE CAN BREATH. PT BELONGINGS WITH PT TO CAR.

## 2019-10-17 NOTE — NUR
SHIFT SUMMARY
PT HAD UNEVENTFUL NIGHT. BREATHING EVEN AND UNLABORED THROUGHOUT THE NIGHT.
REMAINED ON RA WITH O2 SATS GREATER THAN 90%. NO COMPLAINTS OF PAIN. PT
REPORTED THAT HE HAD A DIFFICULT TIME SLEEPING THIS EVENING. OTHERWISE NO
ACUTE CHANGES. VITAL SIGNS STABLE. WILL CONTINUE TO MONITOR AND REPORT TO DAY
RN.

## 2019-10-22 NOTE — NUR
Pt visit this afternoon. Pt resting in bed and denies pain at this time. Pt
reports mild dyspnea and denies anxiety at this time. Pt's daughter in law at
bedside. Discussed goals of care. Pt and daughter in law report Pt has chosen
hospice. Discussed referral has already been faxed to [facility] from Pt's
PCP. [facility] and [facility]. Discussed paln for
thoracentesis this afternoon. Pt and family also express interest for PleurX
drain placement if Pt is a candidate. No other concerns reported at this time.
Pt and family are agreeable for continued visits from Palliative Care.
 
Spoke with [facility] Liason Gris and discussed case.
 
Spoke with bedside nurse Tripp and discussed case.
 
Spoke with Dr Bermudez and relayed Pt's interest in PleurX drain if appropriate.
 
Palliative Care will remain available.

## 2019-10-22 NOTE — NUR
transfer report on 87 year old MAle who has pleural effusions had
thoracentesis last Wed, back to ER on Sat with SOB. EMS brought in and PT
being admitted for thoracentesis and labs from Pleural fluid. Reportedly has
95% room air sat. Await admission. Report given by Orestes ARCEO in ER

## 2019-10-22 NOTE — NUR
PT ALERT AND ORIENTED THROUGHOUT THIS SHIFT. PT HAD A THORACENTESIS THIS
SHIFT, REMOVING 1500 ML OF FLUID. PT BREATHING HAS BEEN MUCH IMPROVED AFTER
PROCEDURE. PT LETHARGIC EVEN AFTER PROCEDURE. PT HAS HAD VISITORS IN THE ROOM
DURING MUCH OF THIS SHIFT. HOSPICE HAS BEEN IN THE ROOM TO CONSULT WITH THE
PATIENT. PT CURRENTLY IN ROOM WITH FAMILY MEMBERS AT THE BEDSIDE. WILL
CONTINUE TO MONITOR.

## 2019-10-23 NOTE — NUR
Spiritual Care note:
 
Mr. Berry was alone in room and sleeping post-op.  He appears comfortable and
well cared-for.  Prayer provided at bedside.  I will remain available to pt
and family.

## 2019-10-23 NOTE — NUR
PT ALERT AND ORIENTED DURING THIS SHIFT. PT COOPERATIVE WITH CARE. PT IN BED
MOST OF THIS SHIFT. FAMILY HAS BEEN IN THE ROOM PERIODICALLY THROUGHOUT THE
SHIFT. PT LESS ACTIVE DURING THIS SHIFT THAN PREVIOUS SHIFTS. LUNG SOUNDS
CONTINUE TO BE AUDIBLE THROUGHOUT, AFTER THOROCENTESIS YESTERDAY. PT DID NOT
HAVE MUCH OF AN APPETITE DURING THIS SHIFT. PT ENCOURAGED TO DRINK NUTRITION
BEVERAGES. PT CURRENTLY SITTING UP IN BED EATING DINNER, CALL LIGHT IN REACH.
WILL CONTINUE TO MONITOR.

## 2019-10-23 NOTE — NUR
Palliative care follow up visit
 
Met with Tereso in his room this morning.  He is accompanied by his son.  His
son reports that Tereso had a thoracentesis done yesterday which removed 1.5
liters.  Tereso is c/o difficulty breathing at this time.  He was repositioned
in bed and the HOB was elevated to about 30 degrees and he reports his
breathing improved.  Skin color is pale, but no distress noted.  Resp even
and unlabored, no cough or retractions noted.  He is wearing his O2 at 2 l/min
via NC.
 
Pt's son states they are planning to go home with [facility] but would like
to have a pleruX drain placed before discharge.  Pt had a thoracentesis 6 days
ago which removed 1 liter fluid, was discharged home and then was readmitted
on Saturday, a couple days after going home for increased SOB.  Pt's son
reports they do not want to keep bringing Tereso back to the hospital for
treatment.  They want to be able to go home with hospice and stay home and
manage his SOB at home.  Spoke with Maricruz Tejada, hospice liason, who
confirmed that hospice will not cover the cost of pleurX placement if he gets
his pleurX drain placed after he is already receiving hospice services.
 
PleurX drain information given to pt and his son.  Sample pleurX drain shown
to pt and son.  PleurX teaching started.  Pt states he used to take baths,
which would be contraindicated with pleurX drain in place.  Pt's son states
that he has been tried to encourage pt to start showering as it is becoming
more difficult to get Tereso out of the bathtub after his bath.  Tereso agrees he
would rather give up bathing and breathe easier with a pleurX than not have a
pleurX and continue bathing.  Questions answered.  Tereso and son are
interested in pursuing pleurX placement.
 
Spoke with Dr. Bermudez to request a surgical consult.  It is likely that pt's
discharged will be delayed as fluid will need to reaccumulate for a pleurX to
be placed.  If pt is discharged without a pleurX drain, he will be a risk for
another readmission for symtpom management.
 
Plan is for Dr. Bermudez to order a surgical consult.  Updated nursing and
pt/family of plan for pleurX drain placement. PC will follow up with pt and
family for continued pleurX drain managment education.

## 2019-10-24 NOTE — NUR
SHIFT SUMMARY
SASKIA WENT TO HIS PLEUR-X PLACEMENT PROCEDURE AROUND NOON TODAY, NO PROBLEMS
WITH IT.  DRESSING C/D/I.  USING URINAL APPROPRIATELY.  FAMILY VISITED AND HAD
MEETING WITH HOSPICE AND PALLIATIVE.  PT AT 92% on RA BUT GETS SOB, 2L ON
CURRENTLY.  PT DECLIEND TO GET OOB THIS SHIFT, ENCOURAGED MOVING IN BED.  CALL
LIGHT IN REACH, [facility]

## 2019-10-24 NOTE — NUR
Pt visit this AM. Pt is resting in bed upon arrival and denies pain at this
time. Pt reports SOB and states the oxygen is helping. Discussed plan to have
PleurX drain placed today and Pt is still agreeable with plan. Pt reports no
other concerns at this time.
 
Spoke with bedside nurse Whit and discussed case.
 
Spoke with [facility] Liadennis Landry and discussed case.
 
Called and spoke with Pt's daughter in law Marlene and discussed goals of care.
Marlene reports speaking with Pt this AM and Pt is still wanting hospice. No
other concerns reported at this time.
 
Palliative Care will remain available.

## 2019-10-24 NOTE — NUR
10/24/19 1344 García Lazar USED AT OPSITE FROM [facility] [employer] [facility]

## 2019-10-25 NOTE — NUR
Visited with Tereso this morning.  He is awake and states he is ready to go home
today.  He requests that his son be present for pleurX drain education.  Spoke
with nursing, Gris, hospice liason, and Dr. Bermudez.  Care fusion order set rec'd
from MD and ordering paperwork signed by Dr. Bermudez.   Faxed orders to care
fusion, copy of care fusion orders given to Gris.  Requested that nursing
contact [facility] when family arrives to take pt home to go over pleurX drain
education.

## 2019-10-25 NOTE — NUR
PT PLEASANT COOP A/O. DENIES PAIN. 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 PALE. H/R IRREG,
MURMER NOTED. NEW PLUREX DRAIN CDI LEFT RIB CAGE. LUNGS CLEAR, RESP EASY,
UNLABORED. ON 2L O2. BT HYPO. LAST BM 2 DAYS. VOIDS PER URINAL SBA FWW TO
BATHROOM. BED IN LOW POSITION, CALL LITE IN REACH, CALLS APPROP. EXP D.C. 1030
FAMILY/ HOSPICE PER D/C PLANNER

## 2019-10-25 NOTE — NUR
PleurX drain education reviewed with pt and two sons.  Questions answered.
Pt's pleurX cath dsg with a very small amount of dried sero-sang drainage on
gauze under the tegaderm.  PleurX starter kit with 4 1-liter bottles and
pamphlet set home with pt.  Pt's family encouraged to watch pleurX drain
managment video at home and contact hospice for any concerns or questions.

## 2019-10-25 NOTE — NUR
DISCHARGE REVIEWED WITH PT AND FAMILY. NO TELE. IV REMOVED INTACT BY
MARIELOS BLANK. FAMILY VERBALIZED UNDERSTANDING OF MEDS AND INSTRUCTIONS.
[facility] CALLED FOR TRANSPORT

## 2019-10-25 NOTE — NUR
SHIFT SUMMARY
AOX4. LS CLEAR, DENIES SOB. 2L VIA NC, WILL NEED HELP GETTING O2 AT HOME. R
WRIST AND L WRIST IVS ARE SL. L SIDE DRESSING C/D/I FROM PLEURX CATH
PLACEMENT. NO C/O PAIN. VSS. PLAN TO DC TODAY.

## 2022-04-25 NOTE — NUR
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 O2 % ON 2V L
NC. Appetite fair drank ensure. Denies pain or acutre distress. Has supportive
family and has Hospice referral. Pleasant able to communicate. Subsequent Stages Histo Method Verbiage: Using a similar technique to that described above, a thin layer of tissue was removed from all areas where tumor was visible on the previous stage.  The tissue was again oriented, mapped, dyed, and processed as above.